# Patient Record
Sex: MALE | Race: WHITE | NOT HISPANIC OR LATINO | Employment: OTHER | URBAN - METROPOLITAN AREA
[De-identification: names, ages, dates, MRNs, and addresses within clinical notes are randomized per-mention and may not be internally consistent; named-entity substitution may affect disease eponyms.]

---

## 2018-07-20 ENCOUNTER — OFFICE VISIT (OUTPATIENT)
Dept: NEUROSURGERY | Facility: CLINIC | Age: 58
End: 2018-07-20

## 2018-07-20 VITALS — WEIGHT: 193 LBS | HEIGHT: 68 IN | TEMPERATURE: 97.3 F | BODY MASS INDEX: 29.25 KG/M2

## 2018-07-20 DIAGNOSIS — M47.816 FACET ARTHRITIS OF LUMBAR REGION: ICD-10-CM

## 2018-07-20 DIAGNOSIS — M51.36 BULGING LUMBAR DISC: Primary | ICD-10-CM

## 2018-07-20 DIAGNOSIS — M51.36 DEGENERATIVE DISC DISEASE, LUMBAR: ICD-10-CM

## 2018-07-20 DIAGNOSIS — Z87.81 HISTORY OF SPINAL FRACTURE: ICD-10-CM

## 2018-07-20 DIAGNOSIS — M54.16 LUMBAR RADICULOPATHY: ICD-10-CM

## 2018-07-20 PROCEDURE — 99203 OFFICE O/P NEW LOW 30 MIN: CPT | Performed by: NEUROLOGICAL SURGERY

## 2018-07-20 RX ORDER — METHADONE HYDROCHLORIDE 10 MG/1
10 TABLET ORAL 4 TIMES DAILY
COMMUNITY
Start: 2018-07-02 | End: 2022-04-08 | Stop reason: SDUPTHER

## 2018-07-20 RX ORDER — DIAZEPAM 10 MG/1
10 TABLET ORAL 4 TIMES DAILY
COMMUNITY
Start: 2018-07-02 | End: 2022-04-08 | Stop reason: SDUPTHER

## 2018-07-20 RX ORDER — TRAZODONE HYDROCHLORIDE 150 MG/1
150 TABLET ORAL 2 TIMES DAILY
COMMUNITY
Start: 2018-01-08 | End: 2022-10-04

## 2018-07-20 RX ORDER — METHYLPREDNISOLONE 4 MG/1
TABLET ORAL
Qty: 21 TABLET | Refills: 0 | Status: SHIPPED | OUTPATIENT
Start: 2018-07-20 | End: 2022-04-08 | Stop reason: SDUPTHER

## 2018-07-20 RX ORDER — DEXLANSOPRAZOLE 60 MG/1
60 CAPSULE, DELAYED RELEASE ORAL DAILY
COMMUNITY
Start: 2018-06-29 | End: 2022-07-27

## 2018-07-20 RX ORDER — GABAPENTIN 600 MG/1
600 TABLET ORAL 4 TIMES DAILY
COMMUNITY
Start: 2018-07-02 | End: 2022-09-02 | Stop reason: SDUPTHER

## 2018-07-20 NOTE — PROGRESS NOTES
Patient: Juan Madden  : 1960    Primary Care Provider: Per Manley MD    Requesting Provider: As above        History    Chief Complaint: Low back and left leg pain.    History of Present Illness: Ms. 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  He indicates that he completely recovered, however, he's been disabled for 38 years.  He's had chronic low back pain.  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  He was getting by until about a month ago when his pain increased.  He has severe back pain that extends into the left thigh to the knee.  It does not go below the knee.  He has no right leg symptoms.  He is worse with any activity.  Nothing really makes them feel better.    Review of Systems   Constitutional: Negative for activity change, appetite change, chills, diaphoresis, fatigue, fever and unexpected weight change.   HENT: Negative for congestion, dental problem, drooling, ear discharge, ear pain, facial swelling, hearing loss, mouth sores, nosebleeds, postnasal drip, rhinorrhea, sinus pressure, sneezing, sore throat, tinnitus, trouble swallowing and voice change.    Eyes: Negative for photophobia, pain, discharge, redness, itching and visual disturbance.   Respiratory: Positive for choking. Negative for apnea, cough, chest tightness, shortness of breath, wheezing and stridor.    Cardiovascular: Negative for chest pain, palpitations and leg swelling.   Gastrointestinal: Negative for abdominal distention, abdominal pain, anal bleeding, blood in stool, constipation, diarrhea, nausea, rectal pain and vomiting.   Endocrine: Negative for cold intolerance, heat intolerance, polydipsia, polyphagia and polyuria.   Genitourinary: Positive for flank pain. Negative for decreased urine volume, difficulty urinating, dysuria, enuresis, frequency, genital sores, hematuria and urgency.   Musculoskeletal: Positive for arthralgias, back pain and myalgias. Negative for  "gait problem, joint swelling, neck pain and neck stiffness.   Skin: Negative for color change, pallor, rash and wound.   Allergic/Immunologic: Negative for environmental allergies, food allergies and immunocompromised state.   Neurological: Positive for weakness and numbness. Negative for dizziness, tremors, seizures, syncope, facial asymmetry, speech difficulty, light-headedness and headaches.   Hematological: Negative for adenopathy. Does not bruise/bleed easily.   Psychiatric/Behavioral: Negative for agitation, behavioral problems, confusion, decreased concentration, dysphoric mood, hallucinations, self-injury, sleep disturbance and suicidal ideas. The patient is not nervous/anxious and is not hyperactive.        The patient's past medical history, past surgical history, family history, and social history have been reviewed at length in the electronic medical record.    Physical Exam:   Temp 97.3 °F (36.3 °C)   Ht 172.7 cm (68\")   Wt 87.5 kg (193 lb)   BMI 29.35 kg/m²   CONSTITUTIONAL: Patient is well-nourished, pleasant and appears stated age.  CV: Heart regular rate and rhythm without murmur, rub, or gallop.  PULMONARY: Lungs are clear to ascultation.  MUSCULOSKELETAL:  Straight leg raising is positive on the left at 45°.  Renny's Sign is positive on the left.  ROM in back is in all directions.  Tenderness in the back to palpation is not observed.  NEUROLOGICAL:  Orientation, memory, attention span, language function, and cognition have been examined and are intact.  Strength is intact in the lower extremities to direct testing.  Muscle tone is normal throughout.  Station and gait are limping.  Sensation is intact to light touch testing throughout.  Deep tendon reflexes are 1+ and symmetrical except the left knee reflex which is difficult to elicit.  Coordination is intact.      Medical Decision Making    Data Review:   MRI of the lumbar spine dated 7/6/18 demonstrates diffuse degenerative disc disease.  " There is mild wedging of L3 that could be degenerative or potentially from a remote trauma.  There is some disc protrusion in the recess and proximal foramen on the left at L4-5 that could compromise the L4 nerve root.    Diagnosis:   Probable left L4 radiculopathy secondary to disc protrusion.    Treatment Options:   I recommended physical therapy and I prescribed a Medrol Dosepak.  He indicates that he cannot afford physical therapy and as such we will provide him with the bulk of home exercises.  He will follow-up in several weeks.  If he continues to struggle then he may require left L4-5 discectomy, but apparently that could be problematic from a financial standpoint as well.       Diagnosis Plan   1. Bulging lumbar disc  MethylPREDNISolone (MEDROL, LESLYE,) 4 MG tablet   2. Lumbar radiculopathy     3. Degenerative disc disease, lumbar     4. Facet arthritis of lumbar region (CMS/HCC)     5. History of spinal fracture         Scribed for Cortez Serra MD by Delores Vigil CMA on 07/20/2018 at 2:47 PM    I, Dr. Serra, personally performed the services described in the documentation, as scribed in my presence, and it is both accurate and complete.

## 2022-04-08 ENCOUNTER — OFFICE VISIT (OUTPATIENT)
Dept: FAMILY MEDICINE CLINIC | Facility: CLINIC | Age: 62
End: 2022-04-08

## 2022-04-08 VITALS — BODY MASS INDEX: 25.92 KG/M2 | WEIGHT: 175 LBS | HEIGHT: 69 IN

## 2022-04-08 DIAGNOSIS — M54.41 CHRONIC BILATERAL LOW BACK PAIN WITH BILATERAL SCIATICA: Primary | ICD-10-CM

## 2022-04-08 DIAGNOSIS — F41.9 ANXIETY: ICD-10-CM

## 2022-04-08 DIAGNOSIS — M54.42 CHRONIC BILATERAL LOW BACK PAIN WITH BILATERAL SCIATICA: Primary | ICD-10-CM

## 2022-04-08 DIAGNOSIS — G89.29 CHRONIC BILATERAL LOW BACK PAIN WITH BILATERAL SCIATICA: Primary | ICD-10-CM

## 2022-04-08 DIAGNOSIS — K21.9 GASTROESOPHAGEAL REFLUX DISEASE WITHOUT ESOPHAGITIS: ICD-10-CM

## 2022-04-08 PROCEDURE — 99442 PR PHYS/QHP TELEPHONE EVALUATION 11-20 MIN: CPT | Performed by: FAMILY MEDICINE

## 2022-04-08 RX ORDER — DEXLANSOPRAZOLE 60 MG/1
1 CAPSULE, DELAYED RELEASE ORAL DAILY
COMMUNITY
Start: 2022-01-10 | End: 2022-04-08

## 2022-04-08 RX ORDER — DIAZEPAM 10 MG/1
10 TABLET ORAL EVERY 6 HOURS
Qty: 120 TABLET | Refills: 0 | Status: SHIPPED | OUTPATIENT
Start: 2022-04-08 | End: 2022-05-05 | Stop reason: SDUPTHER

## 2022-04-08 RX ORDER — DIAZEPAM 10 MG/1
1 TABLET ORAL EVERY 6 HOURS
COMMUNITY
Start: 2022-03-10 | End: 2022-04-08 | Stop reason: SDUPTHER

## 2022-04-08 RX ORDER — METHADONE HYDROCHLORIDE 10 MG/1
10 TABLET ORAL 4 TIMES DAILY
Qty: 120 TABLET | Refills: 0 | Status: SHIPPED | OUTPATIENT
Start: 2022-04-08 | End: 2022-05-05 | Stop reason: SDUPTHER

## 2022-04-08 NOTE — PROGRESS NOTES
"Chief Complaint  Anxiety (medication) and Back Pain (medication)    Subjective          Juan Madden presents to [facility]  Patient comes in today for recheck of his medications.  Patient has done really good he still try to avoid active Covid because he states that he has worried about death and having still some anxiety over this as well.  His visit was done via telephone today.  Because the patient could not connect via Dragonplay.  And he was not offered by doxy front office staff so basically he consented to have it done by telephone      Objective   Vital Signs:   Ht 175.3 cm (69\")   Wt 79.4 kg (175 lb)   BMI 25.84 kg/m²     Body mass index is 25.84 kg/m².    Review of Systems   Constitutional: Negative.    HENT: Negative for congestion, dental problem, ear discharge, ear pain and sore throat.    Respiratory: Negative for apnea, chest tightness and shortness of breath.    Gastrointestinal: Negative for constipation and nausea.   Endocrine: Negative for polyuria.   Genitourinary: Negative for difficulty urinating.   Musculoskeletal: Positive for arthralgias and back pain. Negative for gait problem.   Skin: Negative for rash.   Hematological: Negative for adenopathy.   Psychiatric/Behavioral: The patient is nervous/anxious.        Past History:  Medical History: has a past medical history of Acute frontal sinusitis, Anxiety, At high risk for falls, Chronic low back pain, Chronic pain, Chronic pain syndrome, Degeneration of lumbar intervertebral disc, GERD without esophagitis, H/O anxiety state, Herpes labialis, High risk medication use, History of erectile dysfunction, Insomnia, Low back pain, Lumbago with sciatica, left side, Obstructive sleep apnea syndrome, Protrusion of lumbar intervertebral disc, Radiculopathy, Reactive depression (situational), Tobacco use, and Weight loss.   Surgical History: has a past surgical history that includes No past surgeries.         Current " Outpatient Medications:   •  DEXILANT 60 MG capsule, Take 60 mg by mouth Daily., Disp: , Rfl:   •  diazePAM (VALIUM) 10 MG tablet, Take 1 tablet by mouth Every 6 (Six) Hours., Disp: 120 tablet, Rfl: 0  •  gabapentin (NEURONTIN) 600 MG tablet, Take 600 mg by mouth 4 (Four) Times a Day., Disp: , Rfl:   •  methadone (DOLOPHINE) 10 MG tablet, Take 1 tablet by mouth 4 (Four) Times a Day,, Disp: 120 tablet, Rfl: 0  •  traZODone (DESYREL) 150 MG tablet, Take 150 mg by mouth 2 (Two) Times a Day., Disp: , Rfl:     Allergies: Patient has no known allergies.    Physical Exam  Neurological:      Mental Status: He is alert and oriented to person, place, and time.   Psychiatric:         Mood and Affect: Mood normal.         Behavior: Behavior normal.         Thought Content: Thought content normal.         Judgment: Judgment normal.          Result Review :                   Assessment and Plan    Diagnoses and all orders for this visit:    1. Chronic bilateral low back pain with bilateral sciatica (Primary)  Comments:  Higinio reviewed and medications refilled.  Patient states he is still trying to do some physical activities  Orders:  -     methadone (DOLOPHINE) 10 MG tablet; Take 1 tablet by mouth 4 (Four) Times a Day,  Dispense: 120 tablet; Refill: 0  -     diazePAM (VALIUM) 10 MG tablet; Take 1 tablet by mouth Every 6 (Six) Hours.  Dispense: 120 tablet; Refill: 0    2. Anxiety  Comments:  Stable continue meds    3. Gastroesophageal reflux disease without esophagitis  Comments:  Refill medications and monitor.  Time spent approximately 20 minutes              Follow Up   Return in about 4 weeks (around 5/6/2022).  Patient was given instructions and counseling regarding his condition or for health maintenance advice. Please see specific information pulled into the AVS if appropriate.     Per Manley MD

## 2022-05-05 ENCOUNTER — OFFICE VISIT (OUTPATIENT)
Dept: FAMILY MEDICINE CLINIC | Facility: CLINIC | Age: 62
End: 2022-05-05

## 2022-05-05 VITALS — HEIGHT: 69 IN | WEIGHT: 175 LBS | BODY MASS INDEX: 25.92 KG/M2

## 2022-05-05 DIAGNOSIS — K21.9 GASTROESOPHAGEAL REFLUX DISEASE WITHOUT ESOPHAGITIS: ICD-10-CM

## 2022-05-05 DIAGNOSIS — M54.42 CHRONIC BILATERAL LOW BACK PAIN WITH BILATERAL SCIATICA: ICD-10-CM

## 2022-05-05 DIAGNOSIS — F41.9 ANXIETY: Primary | ICD-10-CM

## 2022-05-05 DIAGNOSIS — M54.41 CHRONIC BILATERAL LOW BACK PAIN WITH BILATERAL SCIATICA: ICD-10-CM

## 2022-05-05 DIAGNOSIS — G89.29 CHRONIC BILATERAL LOW BACK PAIN WITH BILATERAL SCIATICA: ICD-10-CM

## 2022-05-05 PROCEDURE — 99214 OFFICE O/P EST MOD 30 MIN: CPT | Performed by: FAMILY MEDICINE

## 2022-05-05 RX ORDER — METHADONE HYDROCHLORIDE 10 MG/1
10 TABLET ORAL 4 TIMES DAILY
Qty: 120 TABLET | Refills: 0 | Status: SHIPPED | OUTPATIENT
Start: 2022-05-05 | End: 2022-06-06 | Stop reason: SDUPTHER

## 2022-05-05 RX ORDER — DIAZEPAM 10 MG/1
10 TABLET ORAL EVERY 6 HOURS
Qty: 120 TABLET | Refills: 0 | Status: SHIPPED | OUTPATIENT
Start: 2022-05-05 | End: 2022-06-06 | Stop reason: SDUPTHER

## 2022-05-05 NOTE — PROGRESS NOTES
"Chief Complaint  Anxiety and Back Pain    Subjective          Juan Madden presents to [facility]  History of Present Illness    Objective   Vital Signs:   Ht 175.3 cm (69\")   Wt 79.4 kg (175 lb)   BMI 25.84 kg/m²     Body mass index is 25.84 kg/m².    Review of Systems   Constitutional: Negative.    HENT: Negative for congestion, dental problem, ear discharge, ear pain and sore throat.    Respiratory: Negative for apnea, chest tightness and shortness of breath.    Gastrointestinal: Negative for constipation and nausea.   Endocrine: Negative for polyuria.   Genitourinary: Negative for difficulty urinating.   Musculoskeletal: Positive for back pain. Negative for gait problem.   Skin: Negative for rash.   Hematological: Negative for adenopathy.   Psychiatric/Behavioral: Positive for depressed mood. The patient is nervous/anxious.        Past History:  Medical History: has a past medical history of Acute frontal sinusitis, Anxiety, At high risk for falls, Chronic low back pain, Chronic pain, Chronic pain syndrome, Degeneration of lumbar intervertebral disc, GERD without esophagitis, H/O anxiety state, Herpes labialis, High risk medication use, History of erectile dysfunction, Insomnia, Low back pain, Lumbago with sciatica, left side, Obstructive sleep apnea syndrome, Protrusion of lumbar intervertebral disc, Radiculopathy, Reactive depression (situational), Tobacco use, and Weight loss.   Surgical History: has a past surgical history that includes No past surgeries.         Current Outpatient Medications:   •  diazePAM (VALIUM) 10 MG tablet, Take 1 tablet by mouth Every 6 (Six) Hours., Disp: 120 tablet, Rfl: 0  •  methadone (DOLOPHINE) 10 MG tablet, Take 1 tablet by mouth 4 (Four) Times a Day,, Disp: 120 tablet, Rfl: 0  •  DEXILANT 60 MG capsule, Take 60 mg by mouth Daily., Disp: , Rfl:   •  gabapentin (NEURONTIN) 600 MG tablet, Take 600 mg by mouth 4 (Four) Times a Day., Disp: , Rfl: "   •  traZODone (DESYREL) 150 MG tablet, Take 150 mg by mouth 2 (Two) Times a Day., Disp: , Rfl:     Allergies: Patient has no known allergies.    Physical Exam  Constitutional:       Appearance: Normal appearance.   Neurological:      General: No focal deficit present.      Mental Status: He is alert and oriented to person, place, and time.   Psychiatric:         Mood and Affect: Mood normal.         Behavior: Behavior normal.         Thought Content: Thought content normal.          Result Review :                   Assessment and Plan    Diagnoses and all orders for this visit:    1. Anxiety (Primary)  Comments:  Higinio reviewed meds refilled    2. Chronic bilateral low back pain with bilateral sciatica  Comments:  Higinio reviewed and medications refilled.  Patient states he is still trying to do some physical activities  Orders:  -     methadone (DOLOPHINE) 10 MG tablet; Take 1 tablet by mouth 4 (Four) Times a Day,  Dispense: 120 tablet; Refill: 0  -     diazePAM (VALIUM) 10 MG tablet; Take 1 tablet by mouth Every 6 (Six) Hours.  Dispense: 120 tablet; Refill: 0    3. Gastroesophageal reflux disease without esophagitis  Comments:  He states that he has ran of his medicines but got some more and of no prescription and states he is doing fine              Follow Up   Return in about 4 weeks (around 6/2/2022).  Patient was given instructions and counseling regarding his condition or for health maintenance advice. Please see specific information pulled into the AVS if appropriate.     Per Manley MD

## 2022-05-18 NOTE — PROGRESS NOTES
"Chief Complaint  Anxiety and Back Pain    Subjective          Juan Madden presents to [facility]  Patient states he needs to get his medications refilled he has been doing pretty good he has not not been exposed to COVID recently he has been still staying in and does not want to come the office for this.  Patient gives consent to have his visit done through the telephone.      Objective   Vital Signs:   Ht 175.3 cm (69\")   Wt 79.4 kg (175 lb)   BMI 25.84 kg/m²     Body mass index is 25.84 kg/m².    Review of Systems    Past History:  Medical History: has a past medical history of Acute frontal sinusitis, Anxiety, At high risk for falls, Chronic low back pain, Chronic pain, Chronic pain syndrome, Degeneration of lumbar intervertebral disc, GERD without esophagitis, H/O anxiety state, Herpes labialis, High risk medication use, History of erectile dysfunction, Insomnia, Low back pain, Lumbago with sciatica, left side, Obstructive sleep apnea syndrome, Protrusion of lumbar intervertebral disc, Radiculopathy, Reactive depression (situational), Tobacco use, and Weight loss.   Surgical History: has a past surgical history that includes No past surgeries.         Current Outpatient Medications:   •  diazePAM (VALIUM) 10 MG tablet, Take 1 tablet by mouth Every 6 (Six) Hours., Disp: 120 tablet, Rfl: 0  •  methadone (DOLOPHINE) 10 MG tablet, Take 1 tablet by mouth 4 (Four) Times a Day,, Disp: 120 tablet, Rfl: 0  •  DEXILANT 60 MG capsule, Take 60 mg by mouth Daily., Disp: , Rfl:   •  gabapentin (NEURONTIN) 600 MG tablet, Take 600 mg by mouth 4 (Four) Times a Day., Disp: , Rfl:   •  traZODone (DESYREL) 150 MG tablet, Take 150 mg by mouth 2 (Two) Times a Day., Disp: , Rfl:     Allergies: Patient has no known allergies.    Physical Exam     Result Review :                   Assessment and Plan    Diagnoses and all orders for this visit:    1. Anxiety (Primary)  Comments:  Higinio reviewed meds " refilled    2. Chronic bilateral low back pain with bilateral sciatica  Comments:  Higinio reviewed and medications refilled.  Patient states he is still trying to do some physical activities  Orders:  -     methadone (DOLOPHINE) 10 MG tablet; Take 1 tablet by mouth 4 (Four) Times a Day,  Dispense: 120 tablet; Refill: 0  -     diazePAM (VALIUM) 10 MG tablet; Take 1 tablet by mouth Every 6 (Six) Hours.  Dispense: 120 tablet; Refill: 0    3. Gastroesophageal reflux disease without esophagitis  Comments:  He states that he has ran of his medicines but got some more and of no prescription and states he is doing fine              Follow Up   Return in about 4 weeks (around 6/2/2022).  Patient was given instructions and counseling regarding his condition or for health maintenance advice. Please see specific information pulled into the AVS if appropriate.     Per Manley MD

## 2022-06-06 ENCOUNTER — OFFICE VISIT (OUTPATIENT)
Dept: FAMILY MEDICINE CLINIC | Facility: CLINIC | Age: 62
End: 2022-06-06

## 2022-06-06 DIAGNOSIS — G89.29 CHRONIC BILATERAL LOW BACK PAIN WITH BILATERAL SCIATICA: ICD-10-CM

## 2022-06-06 DIAGNOSIS — M54.41 CHRONIC BILATERAL LOW BACK PAIN WITH BILATERAL SCIATICA: ICD-10-CM

## 2022-06-06 DIAGNOSIS — G25.81 RESTLESS LEG SYNDROME: ICD-10-CM

## 2022-06-06 DIAGNOSIS — K21.9 GASTROESOPHAGEAL REFLUX DISEASE WITHOUT ESOPHAGITIS: Primary | ICD-10-CM

## 2022-06-06 DIAGNOSIS — M54.42 CHRONIC BILATERAL LOW BACK PAIN WITH BILATERAL SCIATICA: ICD-10-CM

## 2022-06-06 PROCEDURE — 99212 OFFICE O/P EST SF 10 MIN: CPT | Performed by: FAMILY MEDICINE

## 2022-06-06 RX ORDER — METHADONE HYDROCHLORIDE 10 MG/1
10 TABLET ORAL 4 TIMES DAILY
Qty: 120 TABLET | Refills: 0 | Status: SHIPPED | OUTPATIENT
Start: 2022-06-06 | End: 2022-07-06 | Stop reason: SDUPTHER

## 2022-06-06 RX ORDER — DIAZEPAM 10 MG/1
10 TABLET ORAL EVERY 6 HOURS
Qty: 120 TABLET | Refills: 0 | Status: SHIPPED | OUTPATIENT
Start: 2022-06-06 | End: 2022-07-06 | Stop reason: SDUPTHER

## 2022-06-06 NOTE — PROGRESS NOTES
Chief Complaint  Back Pain    Subjective          Juan Madden presents to [facility]  Back Pain  Associated symptoms include numbness.       Objective   Vital Signs:   There were no vitals taken for this visit.    There is no height or weight on file to calculate BMI.    Review of Systems   Musculoskeletal: Positive for back pain.   Neurological: Positive for numbness.       Past History:  Medical History: has a past medical history of Acute frontal sinusitis, Anxiety, At high risk for falls, Chronic low back pain, Chronic pain, Chronic pain syndrome, Degeneration of lumbar intervertebral disc, GERD without esophagitis, H/O anxiety state, Herpes labialis, High risk medication use, History of erectile dysfunction, Insomnia, Low back pain, Lumbago with sciatica, left side, Obstructive sleep apnea syndrome, Protrusion of lumbar intervertebral disc, Radiculopathy, Reactive depression (situational), Tobacco use, and Weight loss.   Surgical History: has a past surgical history that includes No past surgeries.         Current Outpatient Medications:   •  diazePAM (VALIUM) 10 MG tablet, Take 1 tablet by mouth Every 6 (Six) Hours., Disp: 120 tablet, Rfl: 0  •  methadone (DOLOPHINE) 10 MG tablet, Take 1 tablet by mouth 4 (Four) Times a Day,, Disp: 120 tablet, Rfl: 0  •  DEXILANT 60 MG capsule, Take 60 mg by mouth Daily., Disp: , Rfl:   •  gabapentin (NEURONTIN) 600 MG tablet, Take 600 mg by mouth 4 (Four) Times a Day., Disp: , Rfl:   •  traZODone (DESYREL) 150 MG tablet, Take 150 mg by mouth 2 (Two) Times a Day., Disp: , Rfl:     Allergies: Patient has no known allergies.    Physical Exam  Neurological:      Mental Status: He is alert and oriented to person, place, and time.   Psychiatric:         Mood and Affect: Mood normal.         Behavior: Behavior normal.         Thought Content: Thought content normal.          Result Review :                   Assessment and Plan    Diagnoses and all  orders for this visit:    1. Gastroesophageal reflux disease without esophagitis (Primary)  Comments:  continue meds    2. Chronic bilateral low back pain with bilateral sciatica  Comments:  Higinio reviewed and medications refilled.  Patient states he is still trying to do some physical activities  Orders:  -     diazePAM (VALIUM) 10 MG tablet; Take 1 tablet by mouth Every 6 (Six) Hours.  Dispense: 120 tablet; Refill: 0  -     methadone (DOLOPHINE) 10 MG tablet; Take 1 tablet by mouth 4 (Four) Times a Day,  Dispense: 120 tablet; Refill: 0    3. Restless leg syndrome  Comments:  monitoring      I spent 15 minutes caring for Juan on this date of service. This time includes time spent by me in the following activities:reviewing tests, obtaining and/or reviewing a separately obtained history and ordering medications, tests, or procedures        Follow Up   No follow-ups on file.  Patient was given instructions and counseling regarding his condition or for health maintenance advice. Please see specific information pulled into the AVS if appropriate.     Per Manley MD

## 2022-06-09 RX ORDER — SILDENAFIL 100 MG/1
TABLET, FILM COATED ORAL
Qty: 30 TABLET | Refills: 0 | Status: SHIPPED | OUTPATIENT
Start: 2022-06-09 | End: 2022-07-07

## 2022-07-06 ENCOUNTER — OFFICE VISIT (OUTPATIENT)
Dept: FAMILY MEDICINE CLINIC | Facility: CLINIC | Age: 62
End: 2022-07-06

## 2022-07-06 VITALS — HEIGHT: 69 IN | BODY MASS INDEX: 25.33 KG/M2 | WEIGHT: 171 LBS

## 2022-07-06 DIAGNOSIS — M54.41 CHRONIC BILATERAL LOW BACK PAIN WITH BILATERAL SCIATICA: ICD-10-CM

## 2022-07-06 DIAGNOSIS — M54.42 CHRONIC BILATERAL LOW BACK PAIN WITH BILATERAL SCIATICA: ICD-10-CM

## 2022-07-06 DIAGNOSIS — G25.81 RESTLESS LEG SYNDROME: Primary | ICD-10-CM

## 2022-07-06 DIAGNOSIS — G89.29 CHRONIC BILATERAL LOW BACK PAIN WITH BILATERAL SCIATICA: ICD-10-CM

## 2022-07-06 DIAGNOSIS — K21.9 GASTROESOPHAGEAL REFLUX DISEASE WITHOUT ESOPHAGITIS: ICD-10-CM

## 2022-07-06 PROCEDURE — 99212 OFFICE O/P EST SF 10 MIN: CPT | Performed by: FAMILY MEDICINE

## 2022-07-06 RX ORDER — DIAZEPAM 10 MG/1
10 TABLET ORAL EVERY 6 HOURS
Qty: 120 TABLET | Refills: 0 | Status: SHIPPED | OUTPATIENT
Start: 2022-07-06 | End: 2022-08-02 | Stop reason: SDUPTHER

## 2022-07-06 RX ORDER — METHADONE HYDROCHLORIDE 10 MG/1
10 TABLET ORAL 4 TIMES DAILY
Qty: 120 TABLET | Refills: 0 | Status: SHIPPED | OUTPATIENT
Start: 2022-07-06 | End: 2022-08-02 | Stop reason: SDUPTHER

## 2022-07-06 NOTE — PROGRESS NOTES
"Chief Complaint  Back Pain (Patient needs a refill on his pain med.), Anxiety (Patient needs a refill on his med.), and Erectile Dysfunction (Pt needs a refill on his med.)    Subjective          Juan Madden presents to [facility]  Comes in today needing his medications refilled he denies any other problems or resection of a short of breath or different problems or difficulties at this time.  She does consent to have his visit done through the telephone today      Objective   Vital Signs:   Ht 175.3 cm (69\")   Wt 77.6 kg (171 lb) Comment: Pt reported vitals.  BMI 25.25 kg/m²     Body mass index is 25.25 kg/m².    Review of Systems   Constitutional: Negative.    HENT: Negative for congestion, dental problem, ear discharge, ear pain and sore throat.    Respiratory: Negative for apnea, chest tightness and shortness of breath.    Gastrointestinal: Negative for constipation and nausea.   Endocrine: Negative for polyuria.   Genitourinary: Negative for difficulty urinating.   Musculoskeletal: Positive for arthralgias and back pain. Negative for gait problem.   Skin: Negative for rash.   Hematological: Negative for adenopathy.   Psychiatric/Behavioral: The patient is nervous/anxious.        Past History:  Medical History: has a past medical history of Acute frontal sinusitis, Anxiety, At high risk for falls, Chronic low back pain, Chronic pain, Chronic pain syndrome, Degeneration of lumbar intervertebral disc, GERD without esophagitis, H/O anxiety state, Herpes labialis, High risk medication use, History of erectile dysfunction, Insomnia, Low back pain, Lumbago with sciatica, left side, Obstructive sleep apnea syndrome, Protrusion of lumbar intervertebral disc, Radiculopathy, Reactive depression (situational), Tobacco use, and Weight loss.   Surgical History: has a past surgical history that includes No past surgeries.         Current Outpatient Medications:   •  DEXILANT 60 MG capsule, " Take 60 mg by mouth Daily., Disp: , Rfl:   •  gabapentin (NEURONTIN) 600 MG tablet, Take 600 mg by mouth 4 (Four) Times a Day., Disp: , Rfl:   •  sildenafil (VIAGRA) 100 MG tablet, TAKE ONE TABLET BY MOUTH  AS NEEDED APPROXIMATELY  ONE HOUR BEFORE SEXUAL ACTIVITY, Disp: 30 tablet, Rfl: 0  •  traZODone (DESYREL) 150 MG tablet, Take 150 mg by mouth 2 (Two) Times a Day., Disp: , Rfl:   •  diazePAM (VALIUM) 10 MG tablet, Take 1 tablet by mouth Every 6 (Six) Hours., Disp: 120 tablet, Rfl: 0  •  methadone (DOLOPHINE) 10 MG tablet, Take 1 tablet by mouth 4 (Four) Times a Day,, Disp: 120 tablet, Rfl: 0    Allergies: Patient has no known allergies.    Physical Exam  Neurological:      General: No focal deficit present.      Mental Status: He is alert and oriented to person, place, and time.   Psychiatric:         Mood and Affect: Mood normal.          Result Review :                   Assessment and Plan    Diagnoses and all orders for this visit:    1. Restless leg syndrome (Primary)  Comments:  Continue meds and monitor    2. Chronic bilateral low back pain with bilateral sciatica  Comments:  Higinio reviewed and medications refilled.  Patient states he is still trying to do some physical activities  Orders:  -     methadone (DOLOPHINE) 10 MG tablet; Take 1 tablet by mouth 4 (Four) Times a Day,  Dispense: 120 tablet; Refill: 0  -     diazePAM (VALIUM) 10 MG tablet; Take 1 tablet by mouth Every 6 (Six) Hours.  Dispense: 120 tablet; Refill: 0    3. Gastroesophageal reflux disease without esophagitis  Comments:  stable continue meds      I spent 13 minutes caring for Juan on this date of service. This time includes time spent by me in the following activities:preparing for the visit, obtaining and/or reviewing a separately obtained history and ordering medications, tests, or procedures        Follow Up   Return in about 4 weeks (around 8/3/2022).  Patient was given instructions and counseling regarding his condition or for  health maintenance advice. Please see specific information pulled into the AVS if appropriate.     Per Manley MD

## 2022-07-07 RX ORDER — SILDENAFIL 100 MG/1
TABLET, FILM COATED ORAL
Qty: 30 TABLET | Refills: 0 | Status: SHIPPED | OUTPATIENT
Start: 2022-07-07 | End: 2022-08-02 | Stop reason: SDUPTHER

## 2022-07-07 NOTE — TELEPHONE ENCOUNTER
ANISH PATIENT     Patient called and he forgot to ask for refill on his Sildenafil 100mg tablet when he had appt yesterday.

## 2022-07-27 RX ORDER — DEXLANSOPRAZOLE 60 MG/1
CAPSULE, DELAYED RELEASE ORAL
Qty: 90 CAPSULE | Refills: 0 | Status: SHIPPED | OUTPATIENT
Start: 2022-07-27 | End: 2022-08-02 | Stop reason: SDUPTHER

## 2022-08-02 ENCOUNTER — OFFICE VISIT (OUTPATIENT)
Dept: FAMILY MEDICINE CLINIC | Facility: CLINIC | Age: 62
End: 2022-08-02

## 2022-08-02 DIAGNOSIS — G25.81 RESTLESS LEG SYNDROME: Primary | ICD-10-CM

## 2022-08-02 DIAGNOSIS — G89.29 CHRONIC BILATERAL LOW BACK PAIN WITH BILATERAL SCIATICA: ICD-10-CM

## 2022-08-02 DIAGNOSIS — M54.41 CHRONIC BILATERAL LOW BACK PAIN WITH BILATERAL SCIATICA: ICD-10-CM

## 2022-08-02 DIAGNOSIS — M54.42 CHRONIC BILATERAL LOW BACK PAIN WITH BILATERAL SCIATICA: ICD-10-CM

## 2022-08-02 DIAGNOSIS — K21.9 GASTROESOPHAGEAL REFLUX DISEASE WITHOUT ESOPHAGITIS: ICD-10-CM

## 2022-08-02 PROCEDURE — 99442 PR PHYS/QHP TELEPHONE EVALUATION 11-20 MIN: CPT | Performed by: FAMILY MEDICINE

## 2022-08-02 RX ORDER — SILDENAFIL 100 MG/1
TABLET, FILM COATED ORAL
Qty: 30 TABLET | Refills: 5 | Status: SHIPPED | OUTPATIENT
Start: 2022-08-02 | End: 2023-03-02 | Stop reason: SDUPTHER

## 2022-08-02 RX ORDER — DEXLANSOPRAZOLE 60 MG/1
1 CAPSULE, DELAYED RELEASE ORAL DAILY
Qty: 90 CAPSULE | Refills: 1 | Status: SHIPPED | OUTPATIENT
Start: 2022-08-02 | End: 2023-03-02

## 2022-08-02 RX ORDER — DIAZEPAM 10 MG/1
10 TABLET ORAL EVERY 6 HOURS
Qty: 120 TABLET | Refills: 0 | Status: SHIPPED | OUTPATIENT
Start: 2022-08-02 | End: 2022-09-02 | Stop reason: SDUPTHER

## 2022-08-02 RX ORDER — METHADONE HYDROCHLORIDE 10 MG/1
10 TABLET ORAL 4 TIMES DAILY
Qty: 120 TABLET | Refills: 0 | Status: SHIPPED | OUTPATIENT
Start: 2022-08-02 | End: 2022-09-02 | Stop reason: SDUPTHER

## 2022-08-02 NOTE — PROGRESS NOTES
Chief Complaint  Anxiety and Back Pain    Subjective          Juan Madden presents to [facility]  Comes in today says he needs his meds refilled he says otherwise has been doing pretty good he says that he has been trying avoid the COVID and denies any other difficulties.  Patient does give consent to have his visit done through the telephone today      Objective   Vital Signs:   There were no vitals taken for this visit.    There is no height or weight on file to calculate BMI.    Review of Systems   Constitutional: Negative.    HENT: Negative for congestion, dental problem, ear discharge, ear pain and sore throat.    Respiratory: Negative for apnea, chest tightness and shortness of breath.    Gastrointestinal: Negative for constipation and nausea.   Endocrine: Negative for polyuria.   Genitourinary: Negative for difficulty urinating.   Musculoskeletal: Positive for arthralgias and back pain. Negative for gait problem.   Skin: Negative for rash.   Hematological: Negative for adenopathy.       Past History:  Medical History: has a past medical history of Acute frontal sinusitis, Anxiety, At high risk for falls, Chronic low back pain, Chronic pain, Chronic pain syndrome, Degeneration of lumbar intervertebral disc, GERD without esophagitis, H/O anxiety state, Herpes labialis, High risk medication use, History of erectile dysfunction, Insomnia, Low back pain, Lumbago with sciatica, left side, Obstructive sleep apnea syndrome, Protrusion of lumbar intervertebral disc, Radiculopathy, Reactive depression (situational), Tobacco use, and Weight loss.   Surgical History: has a past surgical history that includes No past surgeries.         Current Outpatient Medications:   •  dexlansoprazole (Dexilant) 60 MG capsule, Take 1 capsule by mouth Daily., Disp: 90 capsule, Rfl: 1  •  diazePAM (VALIUM) 10 MG tablet, Take 1 tablet by mouth Every 6 (Six) Hours., Disp: 120 tablet, Rfl: 0  •  methadone  (DOLOPHINE) 10 MG tablet, Take 1 tablet by mouth 4 (Four) Times a Day,, Disp: 120 tablet, Rfl: 0  •  sildenafil (VIAGRA) 100 MG tablet, Half to 1 tablet 30 minutes before sexual activity, Disp: 30 tablet, Rfl: 5  •  gabapentin (NEURONTIN) 600 MG tablet, Take 600 mg by mouth 4 (Four) Times a Day., Disp: , Rfl:   •  traZODone (DESYREL) 150 MG tablet, Take 150 mg by mouth 2 (Two) Times a Day., Disp: , Rfl:     Allergies: Patient has no known allergies.    Physical Exam  Constitutional:       Appearance: Normal appearance.   Neurological:      General: No focal deficit present.      Mental Status: He is alert. He is disoriented.   Psychiatric:         Mood and Affect: Mood normal.          Result Review :                   Assessment and Plan    Diagnoses and all orders for this visit:    1. Restless leg syndrome (Primary)  Comments:  Stable continue medications    2. Gastroesophageal reflux disease without esophagitis  Comments:  Refill Dexilant  Orders:  -     dexlansoprazole (Dexilant) 60 MG capsule; Take 1 capsule by mouth Daily.  Dispense: 90 capsule; Refill: 1    3. Chronic bilateral low back pain with bilateral sciatica  Comments:  Higinio reviewed and medications refilled.  Patient states he is still trying to do some physical activities  Orders:  -     diazePAM (VALIUM) 10 MG tablet; Take 1 tablet by mouth Every 6 (Six) Hours.  Dispense: 120 tablet; Refill: 0  -     methadone (DOLOPHINE) 10 MG tablet; Take 1 tablet by mouth 4 (Four) Times a Day,  Dispense: 120 tablet; Refill: 0    Other orders  -     sildenafil (VIAGRA) 100 MG tablet; Half to 1 tablet 30 minutes before sexual activity  Dispense: 30 tablet; Refill: 5      I spent 15 minutes caring for Juan on this date of service. This time includes time spent by me in the following activities:preparing for the visit, obtaining and/or reviewing a separately obtained history, performing a medically appropriate examination and/or evaluation  and ordering  medications, tests, or procedures        Follow Up   Return in about 4 weeks (around 8/30/2022).  Patient was given instructions and counseling regarding his condition or for health maintenance advice. Please see specific information pulled into the AVS if appropriate.     Per Manley MD

## 2022-09-02 ENCOUNTER — TELEMEDICINE (OUTPATIENT)
Dept: FAMILY MEDICINE CLINIC | Facility: CLINIC | Age: 62
End: 2022-09-02

## 2022-09-02 VITALS — HEIGHT: 69 IN | BODY MASS INDEX: 26.66 KG/M2 | WEIGHT: 180 LBS

## 2022-09-02 DIAGNOSIS — M54.42 CHRONIC BILATERAL LOW BACK PAIN WITH BILATERAL SCIATICA: ICD-10-CM

## 2022-09-02 DIAGNOSIS — K21.9 GASTROESOPHAGEAL REFLUX DISEASE WITHOUT ESOPHAGITIS: ICD-10-CM

## 2022-09-02 DIAGNOSIS — M54.41 CHRONIC BILATERAL LOW BACK PAIN WITH BILATERAL SCIATICA: ICD-10-CM

## 2022-09-02 DIAGNOSIS — G89.29 CHRONIC BILATERAL LOW BACK PAIN WITH BILATERAL SCIATICA: ICD-10-CM

## 2022-09-02 DIAGNOSIS — G25.81 RESTLESS LEG SYNDROME: Primary | ICD-10-CM

## 2022-09-02 PROCEDURE — 99213 OFFICE O/P EST LOW 20 MIN: CPT | Performed by: FAMILY MEDICINE

## 2022-09-02 RX ORDER — DIAZEPAM 10 MG/1
10 TABLET ORAL EVERY 6 HOURS
Qty: 120 TABLET | Refills: 0 | Status: SHIPPED | OUTPATIENT
Start: 2022-09-02 | End: 2022-10-04 | Stop reason: SDUPTHER

## 2022-09-02 RX ORDER — METHADONE HYDROCHLORIDE 10 MG/1
10 TABLET ORAL 4 TIMES DAILY
Qty: 120 TABLET | Refills: 0 | Status: SHIPPED | OUTPATIENT
Start: 2022-09-02 | End: 2022-10-04 | Stop reason: SDUPTHER

## 2022-09-02 RX ORDER — GABAPENTIN 600 MG/1
600 TABLET ORAL 3 TIMES DAILY
Qty: 90 TABLET | Refills: 5 | Status: SHIPPED | OUTPATIENT
Start: 2022-09-02 | End: 2023-03-02 | Stop reason: SDUPTHER

## 2022-09-02 NOTE — PROGRESS NOTES
"Chief Complaint  Restless Legs Syndrome (Med refill) and Back Pain (Med refill )    Subjective          Juan Madden presents to [facility]  Patient comes in today to get his medications refilled says that he is restless legs been acting up and he wants to get a refill on his Neurontin he says everything else has been doing pretty good he is avoided getting COVID presently.  Patient consents to having visit done through [email or web address] patient today is actually at home and I am at the clinic    Back Pain        Objective   Vital Signs:   Ht 175.3 cm (69\")   Wt 81.6 kg (180 lb)   BMI 26.58 kg/m²     Body mass index is 26.58 kg/m².    Review of Systems   Constitutional: Negative.    HENT: Negative for congestion, dental problem, ear discharge, ear pain and sore throat.    Respiratory: Negative for apnea, chest tightness and shortness of breath.    Gastrointestinal: Negative for constipation and nausea.   Endocrine: Negative for polyuria.   Genitourinary: Negative for difficulty urinating.   Musculoskeletal: Positive for back pain. Negative for arthralgias and gait problem.   Skin: Negative for rash.   Hematological: Negative for adenopathy.       Past History:  Medical History: has a past medical history of Acute frontal sinusitis, Anxiety, At high risk for falls, Chronic low back pain, Chronic pain, Chronic pain syndrome, Degeneration of lumbar intervertebral disc, GERD without esophagitis, H/O anxiety state, Herpes labialis, High risk medication use, History of erectile dysfunction, Insomnia, Low back pain, Lumbago with sciatica, left side, Obstructive sleep apnea syndrome, Protrusion of lumbar intervertebral disc, Radiculopathy, Reactive depression (situational), Tobacco use, and Weight loss.   Surgical History: has a past surgical history that includes No past surgeries.         Current Outpatient Medications:   •  dexlansoprazole (Dexilant) 60 MG capsule, Take 1 capsule by mouth Daily., " Disp: 90 capsule, Rfl: 1  •  diazePAM (VALIUM) 10 MG tablet, Take 1 tablet by mouth Every 6 (Six) Hours., Disp: 120 tablet, Rfl: 0  •  gabapentin (NEURONTIN) 600 MG tablet, Take 1 tablet by mouth 3 (Three) Times a Day., Disp: 90 tablet, Rfl: 5  •  methadone (DOLOPHINE) 10 MG tablet, Take 1 tablet by mouth 4 (Four) Times a Day,, Disp: 120 tablet, Rfl: 0  •  sildenafil (VIAGRA) 100 MG tablet, Half to 1 tablet 30 minutes before sexual activity, Disp: 30 tablet, Rfl: 5  •  traZODone (DESYREL) 150 MG tablet, Take 150 mg by mouth 2 (Two) Times a Day., Disp: , Rfl:     Allergies: Patient has no known allergies.    Physical Exam  Vitals reviewed.   Constitutional:       Appearance: Normal appearance.   HENT:      Head: Normocephalic.      Nose: Nose normal.   Neurological:      General: No focal deficit present.      Mental Status: He is alert and oriented to person, place, and time.   Psychiatric:         Mood and Affect: Mood normal.         Behavior: Behavior normal.          Result Review :                   Assessment and Plan    Diagnoses and all orders for this visit:    1. Restless leg syndrome (Primary)  Comments:  As per refill Neurontin  Orders:  -     gabapentin (NEURONTIN) 600 MG tablet; Take 1 tablet by mouth 3 (Three) Times a Day.  Dispense: 90 tablet; Refill: 5    2. Chronic bilateral low back pain with bilateral sciatica  Comments:  Higinio reviewed and medications refilled.  Patient states he is still trying to do some physical activities  Orders:  -     methadone (DOLOPHINE) 10 MG tablet; Take 1 tablet by mouth 4 (Four) Times a Day,  Dispense: 120 tablet; Refill: 0  -     diazePAM (VALIUM) 10 MG tablet; Take 1 tablet by mouth Every 6 (Six) Hours.  Dispense: 120 tablet; Refill: 0    3. Gastroesophageal reflux disease without esophagitis  Comments:  Patient is Dexilant has been refilled couple days ago he can pick it up when he wants              Follow Up   No follow-ups on file.  Patient was given  instructions and counseling regarding his condition or for health maintenance advice. Please see specific information pulled into the AVS if appropriate.     Per Manley MD

## 2022-10-04 ENCOUNTER — TELEMEDICINE (OUTPATIENT)
Dept: FAMILY MEDICINE CLINIC | Facility: CLINIC | Age: 62
End: 2022-10-04

## 2022-10-04 DIAGNOSIS — M54.41 CHRONIC BILATERAL LOW BACK PAIN WITH BILATERAL SCIATICA: Primary | ICD-10-CM

## 2022-10-04 DIAGNOSIS — Z12.11 COLON CANCER SCREENING: ICD-10-CM

## 2022-10-04 DIAGNOSIS — G89.29 CHRONIC BILATERAL LOW BACK PAIN WITH BILATERAL SCIATICA: Primary | ICD-10-CM

## 2022-10-04 DIAGNOSIS — M54.42 CHRONIC BILATERAL LOW BACK PAIN WITH BILATERAL SCIATICA: Primary | ICD-10-CM

## 2022-10-04 DIAGNOSIS — G25.81 RESTLESS LEG SYNDROME: ICD-10-CM

## 2022-10-04 DIAGNOSIS — R42 DIZZINESS: ICD-10-CM

## 2022-10-04 PROCEDURE — 99213 OFFICE O/P EST LOW 20 MIN: CPT | Performed by: FAMILY MEDICINE

## 2022-10-04 RX ORDER — DIAZEPAM 10 MG/1
10 TABLET ORAL EVERY 6 HOURS
Qty: 120 TABLET | Refills: 0 | Status: SHIPPED | OUTPATIENT
Start: 2022-10-04 | End: 2022-11-03 | Stop reason: SDUPTHER

## 2022-10-04 RX ORDER — METHADONE HYDROCHLORIDE 10 MG/1
10 TABLET ORAL 4 TIMES DAILY
Qty: 120 TABLET | Refills: 0 | Status: SHIPPED | OUTPATIENT
Start: 2022-10-04 | End: 2022-11-03 | Stop reason: SDUPTHER

## 2022-10-04 NOTE — PROGRESS NOTES
Chief Complaint  Anxiety and Back Pain    Subjective          Juan Madden presents to [facility]  History of Present Illness  Patient comes in today saying he needs his medications refilled he has had some dizziness off and on and is not sure exactly what caused it he says he just feels like his balance is off and for few days he said he had a little bit of a cold but he says he not sure that had anything to do with it.  He also states he took a fit test and it was reported back from his insurance company is positive and he would like to get a screening colonoscopy set up as well.  Patient consents to have visit done through [email or web address] today patient is at home and I am in the clinic      Objective   Vital Signs:   There were no vitals taken for this visit.    There is no height or weight on file to calculate BMI.    Review of Systems   Constitutional: Negative.    HENT: Negative for congestion, dental problem, ear discharge, ear pain and sore throat.    Respiratory: Negative for apnea, chest tightness and shortness of breath.    Gastrointestinal: Negative for constipation and nausea.   Endocrine: Negative for polyuria.   Genitourinary: Negative for difficulty urinating.   Musculoskeletal: Positive for arthralgias and back pain. Negative for gait problem.   Skin: Negative for rash.   Hematological: Negative for adenopathy.   Psychiatric/Behavioral: The patient is nervous/anxious.        Past History:  Medical History: has a past medical history of Acute frontal sinusitis, Anxiety, At high risk for falls, Chronic low back pain, Chronic pain, Chronic pain syndrome, Degeneration of lumbar intervertebral disc, GERD without esophagitis, H/O anxiety state, Herpes labialis, High risk medication use, History of erectile dysfunction, Insomnia, Low back pain, Lumbago with sciatica, left side, Obstructive sleep apnea syndrome, Protrusion of lumbar intervertebral disc, Radiculopathy, Reactive  depression (situational), Tobacco use, and Weight loss.   Surgical History: has a past surgical history that includes No past surgeries.         Current Outpatient Medications:   •  diazePAM (VALIUM) 10 MG tablet, Take 1 tablet by mouth Every 6 (Six) Hours., Disp: 120 tablet, Rfl: 0  •  methadone (DOLOPHINE) 10 MG tablet, Take 1 tablet by mouth 4 (Four) Times a Day,, Disp: 120 tablet, Rfl: 0  •  dexlansoprazole (Dexilant) 60 MG capsule, Take 1 capsule by mouth Daily., Disp: 90 capsule, Rfl: 1  •  gabapentin (NEURONTIN) 600 MG tablet, Take 1 tablet by mouth 3 (Three) Times a Day., Disp: 90 tablet, Rfl: 5  •  sildenafil (VIAGRA) 100 MG tablet, Half to 1 tablet 30 minutes before sexual activity, Disp: 30 tablet, Rfl: 5    Allergies: Patient has no known allergies.    Physical Exam  Constitutional:       Appearance: Normal appearance. He is normal weight.   HENT:      Head: Normocephalic.      Right Ear: External ear normal.      Left Ear: External ear normal.      Nose: Nose normal.   Eyes:      Pupils: Pupils are equal, round, and reactive to light.   Pulmonary:      Effort: Pulmonary effort is normal.   Neurological:      General: No focal deficit present.      Mental Status: He is alert and oriented to person, place, and time.   Psychiatric:         Mood and Affect: Mood normal.          Result Review :                   Assessment and Plan    Diagnoses and all orders for this visit:    1. Chronic bilateral low back pain with bilateral sciatica (Primary)  Comments:  Higinio reviewed and medications refilled.  Patient states he is still trying to do some physical activities  Orders:  -     methadone (DOLOPHINE) 10 MG tablet; Take 1 tablet by mouth 4 (Four) Times a Day,  Dispense: 120 tablet; Refill: 0  -     diazePAM (VALIUM) 10 MG tablet; Take 1 tablet by mouth Every 6 (Six) Hours.  Dispense: 120 tablet; Refill: 0    2. Dizziness  Comments:  possibly related to medication and reduce doses and if not better  return    3. Colon cancer screening  Comments:  states had positive fit test and refer for colonoscopy  Orders:  -     Ambulatory Referral to Vascular Surgery    4. Restless leg syndrome  Comments:  stable and monitor              Follow Up   Return in about 4 weeks (around 11/1/2022).  Patient was given instructions and counseling regarding his condition or for health maintenance advice. Please see specific information pulled into the AVS if appropriate.     Per Manley MD

## 2022-10-07 DIAGNOSIS — Z12.11 COLON CANCER SCREENING: Primary | ICD-10-CM

## 2022-11-03 ENCOUNTER — TELEMEDICINE (OUTPATIENT)
Dept: FAMILY MEDICINE CLINIC | Facility: CLINIC | Age: 62
End: 2022-11-03

## 2022-11-03 VITALS — HEIGHT: 69 IN | WEIGHT: 180 LBS | BODY MASS INDEX: 26.66 KG/M2

## 2022-11-03 DIAGNOSIS — M54.41 CHRONIC BILATERAL LOW BACK PAIN WITH BILATERAL SCIATICA: ICD-10-CM

## 2022-11-03 DIAGNOSIS — G89.29 CHRONIC BILATERAL LOW BACK PAIN WITH BILATERAL SCIATICA: ICD-10-CM

## 2022-11-03 DIAGNOSIS — K21.9 GASTROESOPHAGEAL REFLUX DISEASE WITHOUT ESOPHAGITIS: Primary | ICD-10-CM

## 2022-11-03 DIAGNOSIS — M54.42 CHRONIC BILATERAL LOW BACK PAIN WITH BILATERAL SCIATICA: ICD-10-CM

## 2022-11-03 PROCEDURE — 99213 OFFICE O/P EST LOW 20 MIN: CPT | Performed by: FAMILY MEDICINE

## 2022-11-03 RX ORDER — DIAZEPAM 10 MG/1
10 TABLET ORAL EVERY 6 HOURS
Qty: 120 TABLET | Refills: 0 | Status: SHIPPED | OUTPATIENT
Start: 2022-11-03 | End: 2022-12-05 | Stop reason: SDUPTHER

## 2022-11-03 RX ORDER — METHADONE HYDROCHLORIDE 10 MG/1
10 TABLET ORAL 4 TIMES DAILY
Qty: 120 TABLET | Refills: 0 | Status: SHIPPED | OUTPATIENT
Start: 2022-11-03 | End: 2022-12-05 | Stop reason: SDUPTHER

## 2022-11-03 NOTE — PROGRESS NOTES
"Chief Complaint  Restless Legs Syndrome (Medication refill)    Subjective          Juan Madden presents to [facility]  History of Present Illness  Patient comes in today needs his medications refilled says he been doing pretty good denies any difficulties he is set up to get his colonoscopy consultation in November 22 he states otherwise he is doing pretty good.  Patient is sent to have his visit done through [employer] today he is at home today and I am in the clinic      Objective   Vital Signs:   Ht 175.3 cm (69\")   Wt 81.6 kg (180 lb)   BMI 26.58 kg/m²     Body mass index is 26.58 kg/m².    Review of Systems   Constitutional: Negative.    HENT: Negative for congestion, dental problem, ear discharge, ear pain and sore throat.    Respiratory: Negative for apnea, chest tightness and shortness of breath.    Gastrointestinal: Negative for constipation and nausea.   Endocrine: Negative for polyuria.   Genitourinary: Negative for difficulty urinating.   Musculoskeletal: Positive for arthralgias and back pain. Negative for gait problem.   Skin: Negative for rash.   Hematological: Negative for adenopathy.       Past History:  Medical History: has a past medical history of Acute frontal sinusitis, Anxiety, At high risk for falls, Chronic low back pain, Chronic pain, Chronic pain syndrome, Degeneration of lumbar intervertebral disc, GERD without esophagitis, H/O anxiety state, Herpes labialis, High risk medication use, History of erectile dysfunction, Insomnia, Low back pain, Lumbago with sciatica, left side, Obstructive sleep apnea syndrome, Protrusion of lumbar intervertebral disc, Radiculopathy, Reactive depression (situational), Tobacco use, and Weight loss.   Surgical History: has a past surgical history that includes No past surgeries.         Current Outpatient Medications:   •  dexlansoprazole (Dexilant) 60 MG capsule, Take 1 capsule by mouth Daily., Disp: 90 capsule, Rfl: 1  •  " diazePAM (VALIUM) 10 MG tablet, Take 1 tablet by mouth Every 6 (Six) Hours., Disp: 120 tablet, Rfl: 0  •  gabapentin (NEURONTIN) 600 MG tablet, Take 1 tablet by mouth 3 (Three) Times a Day., Disp: 90 tablet, Rfl: 5  •  methadone (DOLOPHINE) 10 MG tablet, Take 1 tablet by mouth 4 (Four) Times a Day,, Disp: 120 tablet, Rfl: 0  •  sildenafil (VIAGRA) 100 MG tablet, Half to 1 tablet 30 minutes before sexual activity, Disp: 30 tablet, Rfl: 5    Allergies: Patient has no known allergies.    Physical Exam  Constitutional:       Appearance: Normal appearance.   HENT:      Head: Normocephalic.      Right Ear: External ear normal.      Left Ear: External ear normal.      Nose: Nose normal.   Eyes:      Pupils: Pupils are equal, round, and reactive to light.   Neurological:      Mental Status: He is alert and oriented to person, place, and time. Mental status is at baseline.   Psychiatric:         Mood and Affect: Mood normal.          Result Review :                   Assessment and Plan    Diagnoses and all orders for this visit:    1. Gastroesophageal reflux disease without esophagitis (Primary)  Comments:  Continue medications plan for an endoscopy evaluation    2. Chronic bilateral low back pain with bilateral sciatica  Comments:  Higinio reviewed and medications refilled.  Patient states he is still trying to do some physical activities              Follow Up   No follow-ups on file.  Patient was given instructions and counseling regarding his condition or for health maintenance advice. Please see specific information pulled into the AVS if appropriate.     Per Manley MD

## 2022-12-05 ENCOUNTER — TELEMEDICINE (OUTPATIENT)
Dept: FAMILY MEDICINE CLINIC | Facility: CLINIC | Age: 62
End: 2022-12-05

## 2022-12-05 DIAGNOSIS — G89.29 CHRONIC BILATERAL LOW BACK PAIN WITH BILATERAL SCIATICA: ICD-10-CM

## 2022-12-05 DIAGNOSIS — M54.42 CHRONIC BILATERAL LOW BACK PAIN WITH BILATERAL SCIATICA: ICD-10-CM

## 2022-12-05 DIAGNOSIS — M54.41 CHRONIC BILATERAL LOW BACK PAIN WITH BILATERAL SCIATICA: ICD-10-CM

## 2022-12-05 DIAGNOSIS — K21.9 GASTROESOPHAGEAL REFLUX DISEASE WITHOUT ESOPHAGITIS: Primary | ICD-10-CM

## 2022-12-05 PROCEDURE — 99213 OFFICE O/P EST LOW 20 MIN: CPT | Performed by: FAMILY MEDICINE

## 2022-12-05 RX ORDER — DIAZEPAM 10 MG/1
10 TABLET ORAL EVERY 6 HOURS
Qty: 120 TABLET | Refills: 0 | Status: SHIPPED | OUTPATIENT
Start: 2022-12-05 | End: 2023-01-05 | Stop reason: SDUPTHER

## 2022-12-05 RX ORDER — ACYCLOVIR 400 MG/1
400 TABLET ORAL 2 TIMES DAILY
Qty: 30 TABLET | Refills: 5 | Status: SHIPPED | OUTPATIENT
Start: 2022-12-05

## 2022-12-05 RX ORDER — METHADONE HYDROCHLORIDE 10 MG/1
10 TABLET ORAL 4 TIMES DAILY
Qty: 120 TABLET | Refills: 0 | Status: SHIPPED | OUTPATIENT
Start: 2022-12-05 | End: 2023-01-05 | Stop reason: SDUPTHER

## 2022-12-05 NOTE — PROGRESS NOTES
Chief Complaint  Back Pain    Subjective          Juan Madden presents to [facility]  History of Present Illness  Patient comes in today needs medications refilled says he is doing relatively well here recently he says he has not had a flareup of fever blisters and would like some ice like a very cold and is well patient does consent to have his visit done through [employer] today he is at home and I am in the clinic      Objective   Vital Signs:   There were no vitals taken for this visit.    There is no height or weight on file to calculate BMI.    Review of Systems   Constitutional: Negative.    HENT: Negative for congestion, dental problem, ear discharge, ear pain and sore throat.    Respiratory: Negative for apnea, chest tightness and shortness of breath.    Gastrointestinal: Negative for constipation and nausea.   Endocrine: Negative for polyuria.   Genitourinary: Negative for difficulty urinating.   Musculoskeletal: Positive for arthralgias and back pain. Negative for gait problem.   Skin: Negative for rash.   Hematological: Negative for adenopathy.       Past History:  Medical History: has a past medical history of Acute frontal sinusitis, Anxiety, At high risk for falls, Chronic low back pain, Chronic pain, Chronic pain syndrome, Degeneration of lumbar intervertebral disc, GERD without esophagitis, H/O anxiety state, Herpes labialis, High risk medication use, History of erectile dysfunction, Insomnia, Low back pain, Lumbago with sciatica, left side, Obstructive sleep apnea syndrome, Protrusion of lumbar intervertebral disc, Radiculopathy, Reactive depression (situational), Tobacco use, and Weight loss.   Surgical History: has a past surgical history that includes No past surgeries.         Current Outpatient Medications:   •  dexlansoprazole (Dexilant) 60 MG capsule, Take 1 capsule by mouth Daily., Disp: 90 capsule, Rfl: 1  •  diazePAM (VALIUM) 10 MG tablet, Take 1 tablet by  mouth Every 6 (Six) Hours., Disp: 120 tablet, Rfl: 0  •  gabapentin (NEURONTIN) 600 MG tablet, Take 1 tablet by mouth 3 (Three) Times a Day., Disp: 90 tablet, Rfl: 5  •  methadone (DOLOPHINE) 10 MG tablet, Take 1 tablet by mouth 4 (Four) Times a Day,, Disp: 120 tablet, Rfl: 0  •  sildenafil (VIAGRA) 100 MG tablet, Half to 1 tablet 30 minutes before sexual activity, Disp: 30 tablet, Rfl: 5  •  acyclovir (Zovirax) 400 MG tablet, Take 1 tablet by mouth 2 (Two) Times a Day. Take no more than 5 doses a day., Disp: 30 tablet, Rfl: 5    Allergies: Patient has no known allergies.    Physical Exam  Constitutional:       Appearance: Normal appearance.   HENT:      Right Ear: External ear normal.      Left Ear: External ear normal.      Nose: Nose normal.   Pulmonary:      Effort: Pulmonary effort is normal.   Neurological:      Mental Status: He is alert and oriented to person, place, and time.   Psychiatric:         Mood and Affect: Mood normal.          Result Review :                   Assessment and Plan    Diagnoses and all orders for this visit:    1. Gastroesophageal reflux disease without esophagitis (Primary)  Comments:  refilled meds    2. Chronic bilateral low back pain with bilateral sciatica  Comments:  Higinio reviewed and medications refilled.  Patient states he is still trying to do some physical activities  Orders:  -     methadone (DOLOPHINE) 10 MG tablet; Take 1 tablet by mouth 4 (Four) Times a Day,  Dispense: 120 tablet; Refill: 0  -     diazePAM (VALIUM) 10 MG tablet; Take 1 tablet by mouth Every 6 (Six) Hours.  Dispense: 120 tablet; Refill: 0    Other orders  -     acyclovir (Zovirax) 400 MG tablet; Take 1 tablet by mouth 2 (Two) Times a Day. Take no more than 5 doses a day.  Dispense: 30 tablet; Refill: 5              Follow Up   No follow-ups on file.  Patient was given instructions and counseling regarding his condition or for health maintenance advice. Please see specific information pulled into the  AVS if appropriate.     Per Manley MD

## 2023-01-05 ENCOUNTER — TELEMEDICINE (OUTPATIENT)
Dept: FAMILY MEDICINE CLINIC | Facility: CLINIC | Age: 63
End: 2023-01-05
Payer: MEDICARE

## 2023-01-05 DIAGNOSIS — M54.41 CHRONIC BILATERAL LOW BACK PAIN WITH BILATERAL SCIATICA: ICD-10-CM

## 2023-01-05 DIAGNOSIS — K21.9 GASTROESOPHAGEAL REFLUX DISEASE WITHOUT ESOPHAGITIS: ICD-10-CM

## 2023-01-05 DIAGNOSIS — M54.42 CHRONIC BILATERAL LOW BACK PAIN WITH BILATERAL SCIATICA: ICD-10-CM

## 2023-01-05 DIAGNOSIS — G89.29 CHRONIC BILATERAL LOW BACK PAIN WITH BILATERAL SCIATICA: ICD-10-CM

## 2023-01-05 PROCEDURE — 99213 OFFICE O/P EST LOW 20 MIN: CPT | Performed by: FAMILY MEDICINE

## 2023-01-05 RX ORDER — DIAZEPAM 10 MG/1
10 TABLET ORAL EVERY 6 HOURS
Qty: 120 TABLET | Refills: 0 | Status: SHIPPED | OUTPATIENT
Start: 2023-01-05 | End: 2023-02-03 | Stop reason: SDUPTHER

## 2023-01-05 RX ORDER — METHADONE HYDROCHLORIDE 10 MG/1
10 TABLET ORAL 4 TIMES DAILY
Qty: 120 TABLET | Refills: 0 | Status: SHIPPED | OUTPATIENT
Start: 2023-01-05 | End: 2023-02-03 | Stop reason: SDUPTHER

## 2023-01-05 RX ORDER — ESOMEPRAZOLE MAGNESIUM 40 MG/1
40 CAPSULE, DELAYED RELEASE ORAL
Qty: 90 CAPSULE | Refills: 1 | Status: SHIPPED | OUTPATIENT
Start: 2023-01-05

## 2023-01-05 NOTE — PROGRESS NOTES
Chief Complaint  Restless Legs Syndrome and Back Pain    Subjective          Juan Madden presents to [facility]  History of Present Illness  Patient comes in today needs his medications refilled says he is doing relatively well he says he has insurance does not go to pay for his Dexilant anymore he needs to be switched off of it.  Patient does consent to have his visit done through [email or web address] he is in the home and I am in the clinic      Objective   Vital Signs:   There were no vitals taken for this visit.    There is no height or weight on file to calculate BMI.    Review of Systems   Constitutional: Negative.    HENT: Negative for congestion, dental problem, ear discharge, ear pain and sore throat.    Respiratory: Negative for apnea, chest tightness and shortness of breath.    Gastrointestinal: Negative for constipation and nausea.   Endocrine: Negative for polyuria.   Genitourinary: Negative for difficulty urinating.   Musculoskeletal: Positive for arthralgias and back pain. Negative for gait problem.   Skin: Negative for rash.   Hematological: Negative for adenopathy.   Psychiatric/Behavioral: The patient is nervous/anxious.        Past History:  Medical History: has a past medical history of Acute frontal sinusitis, Anxiety, At high risk for falls, Chronic low back pain, Chronic pain, Chronic pain syndrome, Degeneration of lumbar intervertebral disc, GERD without esophagitis, H/O anxiety state, Herpes labialis, High risk medication use, History of erectile dysfunction, Insomnia, Low back pain, Lumbago with sciatica, left side, Obstructive sleep apnea syndrome, Protrusion of lumbar intervertebral disc, Radiculopathy, Reactive depression (situational), Tobacco use, and Weight loss.   Surgical History: has a past surgical history that includes No past surgeries.         Current Outpatient Medications:   •  acyclovir (Zovirax) 400 MG tablet, Take 1 tablet by mouth 2 (Two) Times a Day.  Take no more than 5 doses a day., Disp: 30 tablet, Rfl: 5  •  dexlansoprazole (Dexilant) 60 MG capsule, Take 1 capsule by mouth Daily., Disp: 90 capsule, Rfl: 1  •  diazePAM (VALIUM) 10 MG tablet, Take 1 tablet by mouth Every 6 (Six) Hours., Disp: 120 tablet, Rfl: 0  •  gabapentin (NEURONTIN) 600 MG tablet, Take 1 tablet by mouth 3 (Three) Times a Day., Disp: 90 tablet, Rfl: 5  •  methadone (DOLOPHINE) 10 MG tablet, Take 1 tablet by mouth 4 (Four) Times a Day,, Disp: 120 tablet, Rfl: 0  •  sildenafil (VIAGRA) 100 MG tablet, Half to 1 tablet 30 minutes before sexual activity, Disp: 30 tablet, Rfl: 5  •  esomeprazole (nexIUM) 40 MG capsule, Take 1 capsule by mouth Every Morning Before Breakfast., Disp: 90 capsule, Rfl: 1    Allergies: Patient has no known allergies.    Physical Exam  Constitutional:       Appearance: Normal appearance.   HENT:      Right Ear: External ear normal.      Left Ear: External ear normal.      Nose: Nose normal.   Neurological:      Mental Status: He is alert and oriented to person, place, and time.   Psychiatric:         Mood and Affect: Mood normal.          Result Review :                   Assessment and Plan    Diagnoses and all orders for this visit:    1. Chronic bilateral low back pain with bilateral sciatica  Comments:  Higinio reviewed and medications refilled.  Patient states he is still trying to do some physical activities  Orders:  -     diazePAM (VALIUM) 10 MG tablet; Take 1 tablet by mouth Every 6 (Six) Hours.  Dispense: 120 tablet; Refill: 0  -     methadone (DOLOPHINE) 10 MG tablet; Take 1 tablet by mouth 4 (Four) Times a Day,  Dispense: 120 tablet; Refill: 0    2. Gastroesophageal reflux disease without esophagitis  Comments:  Will try nexium and montior  Orders:  -     esomeprazole (nexIUM) 40 MG capsule; Take 1 capsule by mouth Every Morning Before Breakfast.  Dispense: 90 capsule; Refill: 1              Follow Up   No follow-ups on file.  Patient was given instructions  and counseling regarding his condition or for health maintenance advice. Please see specific information pulled into the AVS if appropriate.     Per Manley MD

## 2023-02-03 ENCOUNTER — TELEMEDICINE (OUTPATIENT)
Dept: FAMILY MEDICINE CLINIC | Facility: CLINIC | Age: 63
End: 2023-02-03
Payer: MEDICARE

## 2023-02-03 DIAGNOSIS — G89.29 CHRONIC BILATERAL LOW BACK PAIN WITH BILATERAL SCIATICA: ICD-10-CM

## 2023-02-03 DIAGNOSIS — M79.605 PAIN OF LEFT LOWER EXTREMITY: Primary | ICD-10-CM

## 2023-02-03 DIAGNOSIS — M54.41 CHRONIC BILATERAL LOW BACK PAIN WITH BILATERAL SCIATICA: ICD-10-CM

## 2023-02-03 DIAGNOSIS — M54.42 CHRONIC BILATERAL LOW BACK PAIN WITH BILATERAL SCIATICA: ICD-10-CM

## 2023-02-03 DIAGNOSIS — F41.9 ANXIETY: ICD-10-CM

## 2023-02-03 PROCEDURE — 99213 OFFICE O/P EST LOW 20 MIN: CPT | Performed by: FAMILY MEDICINE

## 2023-02-03 RX ORDER — METHADONE HYDROCHLORIDE 10 MG/1
10 TABLET ORAL 4 TIMES DAILY
Qty: 120 TABLET | Refills: 0 | Status: SHIPPED | OUTPATIENT
Start: 2023-02-03 | End: 2023-03-02 | Stop reason: SDUPTHER

## 2023-02-03 RX ORDER — DIAZEPAM 10 MG/1
10 TABLET ORAL EVERY 6 HOURS
Qty: 120 TABLET | Refills: 0 | Status: SHIPPED | OUTPATIENT
Start: 2023-02-03 | End: 2023-03-02 | Stop reason: SDUPTHER

## 2023-02-03 RX ORDER — METHYLPREDNISOLONE 4 MG/1
TABLET ORAL
Qty: 19 TABLET | Refills: 0 | Status: SHIPPED | OUTPATIENT
Start: 2023-02-03 | End: 2023-02-13

## 2023-02-03 NOTE — PROGRESS NOTES
Chief Complaint  Back Pain    Subjective          LONNIE RANGEL presents to [facility]  History of Present Illness  Patient comes in today saying that he needs to get his medications refilled he is doing relatively well he has had some more pain noted on his left leg and his sciatica is been acting up a little bit he has responded well to steroids in the past.  He does consent to have his visit done through [email or web address] he is at home and I am in the clinic      Objective   Vital Signs:   There were no vitals taken for this visit.    There is no height or weight on file to calculate BMI.    Review of Systems   Constitutional: Negative.    HENT: Negative for congestion, dental problem, ear discharge, ear pain and sore throat.    Respiratory: Negative for apnea, chest tightness and shortness of breath.    Gastrointestinal: Negative for constipation and nausea.   Endocrine: Negative for polyuria.   Genitourinary: Negative for difficulty urinating.   Musculoskeletal: Positive for arthralgias and back pain. Negative for gait problem.   Skin: Negative for rash.   Hematological: Negative for adenopathy.   Psychiatric/Behavioral: Positive for depressed mood. The patient is nervous/anxious.        Past History:  Medical History: has a past medical history of Acute frontal sinusitis, Anxiety, At high risk for falls, Chronic low back pain, Chronic pain, Chronic pain syndrome, Degeneration of lumbar intervertebral disc, GERD without esophagitis, H/O anxiety state, Herpes labialis, High risk medication use, History of erectile dysfunction, Insomnia, Low back pain, Lumbago with sciatica, left side, Obstructive sleep apnea syndrome, Protrusion of lumbar intervertebral disc, Radiculopathy, Reactive depression (situational), Tobacco use, and Weight loss.   Surgical History: has a past surgical history that includes No past surgeries.         Current Outpatient Medications:   •  acyclovir (Zovirax) 400 MG  tablet, Take 1 tablet by mouth 2 (Two) Times a Day. Take no more than 5 doses a day., Disp: 30 tablet, Rfl: 5  •  dexlansoprazole (Dexilant) 60 MG capsule, Take 1 capsule by mouth Daily., Disp: 90 capsule, Rfl: 1  •  diazePAM (VALIUM) 10 MG tablet, Take 1 tablet by mouth Every 6 (Six) Hours., Disp: 120 tablet, Rfl: 0  •  esomeprazole (nexIUM) 40 MG capsule, Take 1 capsule by mouth Every Morning Before Breakfast., Disp: 90 capsule, Rfl: 1  •  gabapentin (NEURONTIN) 600 MG tablet, Take 1 tablet by mouth 3 (Three) Times a Day., Disp: 90 tablet, Rfl: 5  •  methadone (DOLOPHINE) 10 MG tablet, Take 1 tablet by mouth 4 (Four) Times a Day,, Disp: 120 tablet, Rfl: 0  •  sildenafil (VIAGRA) 100 MG tablet, Half to 1 tablet 30 minutes before sexual activity, Disp: 30 tablet, Rfl: 5  •  methylPREDNISolone (MEDROL) 4 MG tablet, Take 3 tablets by mouth Daily for 3 days, THEN 2 tablets Daily for 3 days, THEN 1 tablet Daily for 4 days., Disp: 19 tablet, Rfl: 0    Allergies: Patient has no known allergies.    Physical Exam  Vitals reviewed.   Constitutional:       Appearance: Normal appearance.   HENT:      Head: Normocephalic.      Right Ear: External ear normal.      Left Ear: External ear normal.      Nose: Nose normal.   Neurological:      General: No focal deficit present.      Mental Status: He is alert. Mental status is at baseline.   Psychiatric:         Mood and Affect: Mood normal.         Behavior: Behavior normal.          Result Review :                   Assessment and Plan    Diagnoses and all orders for this visit:    1. Pain of left lower extremity (Primary)  Comments:  steriods and meds and exercises    2. Chronic bilateral low back pain with bilateral sciatica  Comments:  Higinio reviewed and medications refilled.  Patient states he is still trying to do some physical activities  Orders:  -     diazePAM (VALIUM) 10 MG tablet; Take 1 tablet by mouth Every 6 (Six) Hours.  Dispense: 120 tablet; Refill: 0  -      methadone (DOLOPHINE) 10 MG tablet; Take 1 tablet by mouth 4 (Four) Times a Day,  Dispense: 120 tablet; Refill: 0    3. Anxiety  Comments:  stable    Other orders  -     methylPREDNISolone (MEDROL) 4 MG tablet; Take 3 tablets by mouth Daily for 3 days, THEN 2 tablets Daily for 3 days, THEN 1 tablet Daily for 4 days.  Dispense: 19 tablet; Refill: 0              Follow Up   No follow-ups on file.  Patient was given instructions and counseling regarding his condition or for health maintenance advice. Please see specific information pulled into the AVS if appropriate.     Per Manley MD

## 2023-03-02 ENCOUNTER — TELEMEDICINE (OUTPATIENT)
Dept: FAMILY MEDICINE CLINIC | Facility: CLINIC | Age: 63
End: 2023-03-02
Payer: MEDICARE

## 2023-03-02 VITALS — BODY MASS INDEX: 26.66 KG/M2 | HEIGHT: 69 IN | WEIGHT: 180 LBS

## 2023-03-02 DIAGNOSIS — G89.29 CHRONIC BILATERAL LOW BACK PAIN WITH BILATERAL SCIATICA: ICD-10-CM

## 2023-03-02 DIAGNOSIS — M54.42 CHRONIC BILATERAL LOW BACK PAIN WITH BILATERAL SCIATICA: ICD-10-CM

## 2023-03-02 DIAGNOSIS — M54.41 CHRONIC BILATERAL LOW BACK PAIN WITH BILATERAL SCIATICA: ICD-10-CM

## 2023-03-02 DIAGNOSIS — G25.81 RESTLESS LEG SYNDROME: ICD-10-CM

## 2023-03-02 PROCEDURE — 99214 OFFICE O/P EST MOD 30 MIN: CPT | Performed by: FAMILY MEDICINE

## 2023-03-02 RX ORDER — GABAPENTIN 600 MG/1
600 TABLET ORAL 3 TIMES DAILY
Qty: 90 TABLET | Refills: 5 | Status: SHIPPED | OUTPATIENT
Start: 2023-03-02

## 2023-03-02 RX ORDER — SILDENAFIL 100 MG/1
TABLET, FILM COATED ORAL
Qty: 30 TABLET | Refills: 5 | Status: SHIPPED | OUTPATIENT
Start: 2023-03-02

## 2023-03-02 RX ORDER — DIAZEPAM 10 MG/1
10 TABLET ORAL EVERY 6 HOURS
Qty: 120 TABLET | Refills: 0 | Status: SHIPPED | OUTPATIENT
Start: 2023-03-02 | End: 2023-04-04 | Stop reason: SDUPTHER

## 2023-03-02 RX ORDER — METHADONE HYDROCHLORIDE 10 MG/1
10 TABLET ORAL 4 TIMES DAILY
Qty: 120 TABLET | Refills: 0 | Status: SHIPPED | OUTPATIENT
Start: 2023-03-02 | End: 2023-04-04 | Stop reason: SDUPTHER

## 2023-03-02 NOTE — PROGRESS NOTES
"Chief Complaint  Restless Legs Syndrome and Back Pain    Subjective          LONNIE RANGEL presents to [facility]  History of Present Illness  She states doing relatively well still having some problems out of his left leg he says he is trying to do some exercises things to try to help he says his medications do make a difference but states it has been a chronic issue.  Patient does consent to have his visit through [email or web address] today he is at home and I am in the clinic      Objective   Vital Signs:   Ht 175.3 cm (69.02\")   Wt 81.6 kg (180 lb)   BMI 26.57 kg/m²     Body mass index is 26.57 kg/m².    Review of Systems   Constitutional: Negative.    HENT: Negative for congestion, dental problem, ear discharge, ear pain and sore throat.    Respiratory: Negative for apnea, chest tightness and shortness of breath.    Gastrointestinal: Negative for constipation and nausea.   Endocrine: Negative for polyuria.   Genitourinary: Negative for difficulty urinating.   Musculoskeletal: Positive for arthralgias and back pain. Negative for gait problem.   Skin: Negative for rash.   Hematological: Negative for adenopathy.       Past History:  Medical History: has a past medical history of Acute frontal sinusitis, Anxiety, At high risk for falls, Chronic low back pain, Chronic pain, Chronic pain syndrome, Degeneration of lumbar intervertebral disc, GERD without esophagitis, H/O anxiety state, Herpes labialis, High risk medication use, History of erectile dysfunction, Insomnia, Low back pain, Lumbago with sciatica, left side, Obstructive sleep apnea syndrome, Protrusion of lumbar intervertebral disc, Radiculopathy, Reactive depression (situational), Tobacco use, and Weight loss.   Surgical History: has a past surgical history that includes No past surgeries.         Current Outpatient Medications:   •  diazePAM (VALIUM) 10 MG tablet, Take 1 tablet by mouth Every 6 (Six) Hours., Disp: 120 tablet, Rfl: " 0  •  gabapentin (NEURONTIN) 600 MG tablet, Take 1 tablet by mouth 3 (Three) Times a Day., Disp: 90 tablet, Rfl: 5  •  methadone (DOLOPHINE) 10 MG tablet, Take 1 tablet by mouth 4 (Four) Times a Day,, Disp: 120 tablet, Rfl: 0  •  sildenafil (VIAGRA) 100 MG tablet, Half to 1 tablet 30 minutes before sexual activity, Disp: 30 tablet, Rfl: 5  •  acyclovir (Zovirax) 400 MG tablet, Take 1 tablet by mouth 2 (Two) Times a Day. Take no more than 5 doses a day., Disp: 30 tablet, Rfl: 5  •  esomeprazole (nexIUM) 40 MG capsule, Take 1 capsule by mouth Every Morning Before Breakfast., Disp: 90 capsule, Rfl: 1    Allergies: Patient has no known allergies.    Physical Exam  Constitutional:       Appearance: Normal appearance. He is normal weight.   HENT:      Head: Normocephalic and atraumatic.      Right Ear: External ear normal.      Left Ear: External ear normal.      Nose: Nose normal.   Neurological:      Mental Status: He is alert. Mental status is at baseline.   Psychiatric:         Mood and Affect: Mood normal.         Behavior: Behavior normal.          Result Review :                   Assessment and Plan    Diagnoses and all orders for this visit:    1. Chronic bilateral low back pain with bilateral sciatica  Comments:  Higinio reviewed and medications and deiscussed exercises  Orders:  -     diazePAM (VALIUM) 10 MG tablet; Take 1 tablet by mouth Every 6 (Six) Hours.  Dispense: 120 tablet; Refill: 0  -     methadone (DOLOPHINE) 10 MG tablet; Take 1 tablet by mouth 4 (Four) Times a Day,  Dispense: 120 tablet; Refill: 0    2. Restless leg syndrome  Comments:  As per refill Neurontin  Orders:  -     gabapentin (NEURONTIN) 600 MG tablet; Take 1 tablet by mouth 3 (Three) Times a Day.  Dispense: 90 tablet; Refill: 5    Other orders  -     sildenafil (VIAGRA) 100 MG tablet; Half to 1 tablet 30 minutes before sexual activity  Dispense: 30 tablet; Refill: 5              Follow Up   Return in about 4 weeks (around  3/30/2023).  Patient was given instructions and counseling regarding his condition or for health maintenance advice. Please see specific information pulled into the AVS if appropriate.     Per Manley MD

## 2023-04-04 ENCOUNTER — TELEMEDICINE (OUTPATIENT)
Dept: FAMILY MEDICINE CLINIC | Facility: CLINIC | Age: 63
End: 2023-04-04
Payer: MEDICARE

## 2023-04-04 VITALS — BODY MASS INDEX: 26.66 KG/M2 | HEIGHT: 69 IN | WEIGHT: 180 LBS

## 2023-04-04 DIAGNOSIS — M54.41 CHRONIC BILATERAL LOW BACK PAIN WITH BILATERAL SCIATICA: Primary | ICD-10-CM

## 2023-04-04 DIAGNOSIS — M54.42 CHRONIC BILATERAL LOW BACK PAIN WITH BILATERAL SCIATICA: Primary | ICD-10-CM

## 2023-04-04 DIAGNOSIS — M19.09 PRIMARY OSTEOARTHRITIS OF OTHER SITE: ICD-10-CM

## 2023-04-04 DIAGNOSIS — G89.29 CHRONIC BILATERAL LOW BACK PAIN WITH BILATERAL SCIATICA: Primary | ICD-10-CM

## 2023-04-04 RX ORDER — METHADONE HYDROCHLORIDE 10 MG/1
10 TABLET ORAL 4 TIMES DAILY
Qty: 120 TABLET | Refills: 0 | Status: SHIPPED | OUTPATIENT
Start: 2023-04-04

## 2023-04-04 RX ORDER — DIAZEPAM 10 MG/1
10 TABLET ORAL EVERY 6 HOURS
Qty: 120 TABLET | Refills: 0 | Status: SHIPPED | OUTPATIENT
Start: 2023-04-04

## 2023-04-04 RX ORDER — CELECOXIB 200 MG/1
200 CAPSULE ORAL DAILY
Qty: 30 CAPSULE | Refills: 0 | Status: SHIPPED | OUTPATIENT
Start: 2023-04-04

## 2023-04-04 NOTE — PROGRESS NOTES
"Chief Complaint  Anxiety and Back Pain    Subjective          LONNIE RANGEL presents to [facility]  History of Present Illness  He comes in saying it he is needing his medications refilled he also states that the base of his right and left thumbs are both killing him his left slightly worse than his right he like to get some medications from it because it is Tylenol is not helping him he does not know what he is done he thinks he may just be some arthritic flareup and that has been somewhat bothering for about a week.  Patient does consent to have his visit done through [email or web address] today he is at home and I am in the clinic  Anxiety  Patient reports no nausea or shortness of breath.       Back Pain        Objective   Vital Signs:   Ht 175.3 cm (69.02\")   Wt 81.6 kg (180 lb)   BMI 26.57 kg/m²     Body mass index is 26.57 kg/m².    Review of Systems   Constitutional: Negative.    HENT: Negative for congestion, dental problem, ear discharge, ear pain and sore throat.    Respiratory: Negative for apnea, chest tightness and shortness of breath.    Gastrointestinal: Negative for constipation and nausea.   Endocrine: Negative for polyuria.   Genitourinary: Negative for difficulty urinating.   Musculoskeletal: Positive for arthralgias and back pain. Negative for gait problem.   Skin: Negative for rash.   Hematological: Negative for adenopathy.       Past History:  Medical History: has a past medical history of Acute frontal sinusitis, Anxiety, At high risk for falls, Chronic low back pain, Chronic pain, Chronic pain syndrome, Degeneration of lumbar intervertebral disc, GERD without esophagitis, H/O anxiety state, Herpes labialis, High risk medication use, History of erectile dysfunction, Insomnia, Low back pain, Lumbago with sciatica, left side, Obstructive sleep apnea syndrome, Protrusion of lumbar intervertebral disc, Radiculopathy, Reactive depression (situational), Tobacco use, and " Weight loss.   Surgical History: has a past surgical history that includes No past surgeries.         Current Outpatient Medications:   •  acyclovir (Zovirax) 400 MG tablet, Take 1 tablet by mouth 2 (Two) Times a Day. Take no more than 5 doses a day., Disp: 30 tablet, Rfl: 5  •  diazePAM (VALIUM) 10 MG tablet, Take 1 tablet by mouth Every 6 (Six) Hours., Disp: 120 tablet, Rfl: 0  •  esomeprazole (nexIUM) 40 MG capsule, Take 1 capsule by mouth Every Morning Before Breakfast., Disp: 90 capsule, Rfl: 1  •  gabapentin (NEURONTIN) 600 MG tablet, Take 1 tablet by mouth 3 (Three) Times a Day., Disp: 90 tablet, Rfl: 5  •  methadone (DOLOPHINE) 10 MG tablet, Take 1 tablet by mouth 4 (Four) Times a Day,, Disp: 120 tablet, Rfl: 0  •  sildenafil (VIAGRA) 100 MG tablet, Half to 1 tablet 30 minutes before sexual activity, Disp: 30 tablet, Rfl: 5    Allergies: Patient has no known allergies.    Physical Exam  Constitutional:       Appearance: Normal appearance. He is normal weight.   HENT:      Head: Normocephalic.      Right Ear: External ear normal.      Left Ear: External ear normal.      Nose: Nose normal.      Mouth/Throat:      Mouth: Mucous membranes are moist.   Neurological:      Mental Status: He is alert and oriented to person, place, and time. Mental status is at baseline.   Psychiatric:         Mood and Affect: Mood normal.         Behavior: Behavior normal.          Result Review :                   Assessment and Plan    Diagnoses and all orders for this visit:    1. Chronic bilateral low back pain with bilateral sciatica (Primary)  Comments:  Higinio reviewed and medications and deiscussed exercises    2. Primary osteoarthritis of other site  Comments:  Celebrex ice continue some Stephanie gel if he wants to as well monitor              Follow Up   Return in about 4 weeks (around 5/2/2023).  Patient was given instructions and counseling regarding his condition or for health maintenance advice. Please see specific  information pulled into the AVS if appropriate.     Per Manley MD

## 2023-05-02 ENCOUNTER — TELEMEDICINE (OUTPATIENT)
Dept: FAMILY MEDICINE CLINIC | Facility: CLINIC | Age: 63
End: 2023-05-02
Payer: MEDICARE

## 2023-05-02 VITALS — HEIGHT: 69 IN | BODY MASS INDEX: 26.66 KG/M2 | WEIGHT: 180 LBS

## 2023-05-02 DIAGNOSIS — M54.41 CHRONIC BILATERAL LOW BACK PAIN WITH BILATERAL SCIATICA: ICD-10-CM

## 2023-05-02 DIAGNOSIS — F41.9 ANXIETY: ICD-10-CM

## 2023-05-02 DIAGNOSIS — M19.09 PRIMARY OSTEOARTHRITIS OF OTHER SITE: Primary | ICD-10-CM

## 2023-05-02 DIAGNOSIS — M54.42 CHRONIC BILATERAL LOW BACK PAIN WITH BILATERAL SCIATICA: ICD-10-CM

## 2023-05-02 DIAGNOSIS — G89.29 CHRONIC BILATERAL LOW BACK PAIN WITH BILATERAL SCIATICA: ICD-10-CM

## 2023-05-02 RX ORDER — METHADONE HYDROCHLORIDE 10 MG/1
10 TABLET ORAL 4 TIMES DAILY
Qty: 120 TABLET | Refills: 0 | Status: SHIPPED | OUTPATIENT
Start: 2023-05-02

## 2023-05-02 RX ORDER — CELECOXIB 200 MG/1
200 CAPSULE ORAL DAILY
Qty: 30 CAPSULE | Refills: 2 | Status: SHIPPED | OUTPATIENT
Start: 2023-05-02

## 2023-05-02 NOTE — PROGRESS NOTES
"Chief Complaint  Back Pain and Restless Legs Syndrome    Subjective          LONNIE RANGEL presents to [facility]  History of Present Illness  She comes in today saying that he is doing better he recently contracted COVID says he been really sick he says he got his medications he went acute care center he said he is gotten better he says he needs refill of his regular medications now and he says its still been a little bit tiring but he said he is much improved.  Patient does consent to have his visit done through [email or web address] today he is at home and I am in the clinic  Back Pain        Objective   Vital Signs:   Ht 175.3 cm (69.02\")   Wt 81.6 kg (180 lb)   BMI 26.57 kg/m²     Body mass index is 26.57 kg/m².    Review of Systems   Constitutional: Negative.    HENT: Negative for congestion, dental problem, ear discharge, ear pain and sore throat.    Respiratory: Negative for apnea, chest tightness and shortness of breath.    Gastrointestinal: Negative for constipation and nausea.   Endocrine: Negative for polyuria.   Genitourinary: Negative for difficulty urinating.   Musculoskeletal: Positive for arthralgias and back pain. Negative for gait problem.   Skin: Negative for rash.   Hematological: Negative for adenopathy.       Past History:  Medical History: has a past medical history of Acute frontal sinusitis, Anxiety, At high risk for falls, Chronic low back pain, Chronic pain, Chronic pain syndrome, Degeneration of lumbar intervertebral disc, GERD without esophagitis, H/O anxiety state, Herpes labialis, High risk medication use, History of erectile dysfunction, Insomnia, Low back pain, Lumbago with sciatica, left side, Obstructive sleep apnea syndrome, Protrusion of lumbar intervertebral disc, Radiculopathy, Reactive depression (situational), Tobacco use, and Weight loss.   Surgical History: has a past surgical history that includes No past surgeries.         Current Outpatient " Medications:   •  acyclovir (Zovirax) 400 MG tablet, Take 1 tablet by mouth 2 (Two) Times a Day. Take no more than 5 doses a day., Disp: 30 tablet, Rfl: 5  •  celecoxib (CeleBREX) 200 MG capsule, Take 1 capsule by mouth Daily., Disp: 30 capsule, Rfl: 2  •  diazePAM (VALIUM) 10 MG tablet, Take 1 tablet by mouth Every 6 (Six) Hours., Disp: 120 tablet, Rfl: 0  •  esomeprazole (nexIUM) 40 MG capsule, Take 1 capsule by mouth Every Morning Before Breakfast., Disp: 90 capsule, Rfl: 1  •  gabapentin (NEURONTIN) 600 MG tablet, Take 1 tablet by mouth 3 (Three) Times a Day., Disp: 90 tablet, Rfl: 5  •  methadone (DOLOPHINE) 10 MG tablet, Take 1 tablet by mouth 4 (Four) Times a Day,, Disp: 120 tablet, Rfl: 0  •  sildenafil (VIAGRA) 100 MG tablet, Half to 1 tablet 30 minutes before sexual activity, Disp: 30 tablet, Rfl: 5    Allergies: Patient has no known allergies.    Physical Exam  Vitals reviewed.   Constitutional:       Appearance: Normal appearance.   HENT:      Head: Normocephalic.      Right Ear: External ear normal.      Left Ear: External ear normal.      Nose: Nose normal.      Mouth/Throat:      Mouth: Mucous membranes are moist.   Eyes:      Pupils: Pupils are equal, round, and reactive to light.   Neurological:      Mental Status: He is alert and oriented to person, place, and time. Mental status is at baseline.   Psychiatric:         Mood and Affect: Mood normal.         Behavior: Behavior normal.          Result Review :                   Assessment and Plan    Diagnoses and all orders for this visit:    1. Primary osteoarthritis of other site (Primary)  Comments:  refill celebrex     2. Chronic bilateral low back pain with bilateral sciatica  Comments:  Higinio reviewed and medications and deiscussed exercises  Orders:  -     methadone (DOLOPHINE) 10 MG tablet; Take 1 tablet by mouth 4 (Four) Times a Day,  Dispense: 120 tablet; Refill: 0    3. Anxiety  Comments:  stable    Other orders  -     celecoxib (CeleBREX)  200 MG capsule; Take 1 capsule by mouth Daily.  Dispense: 30 capsule; Refill: 2              Follow Up   No follow-ups on file.  Patient was given instructions and counseling regarding his condition or for health maintenance advice. Please see specific information pulled into the AVS if appropriate.     Per Manley MD

## 2023-05-03 DIAGNOSIS — M54.41 CHRONIC BILATERAL LOW BACK PAIN WITH BILATERAL SCIATICA: ICD-10-CM

## 2023-05-03 DIAGNOSIS — M54.42 CHRONIC BILATERAL LOW BACK PAIN WITH BILATERAL SCIATICA: ICD-10-CM

## 2023-05-03 DIAGNOSIS — G89.29 CHRONIC BILATERAL LOW BACK PAIN WITH BILATERAL SCIATICA: ICD-10-CM

## 2023-05-03 RX ORDER — DIAZEPAM 10 MG/1
10 TABLET ORAL EVERY 6 HOURS
Qty: 120 TABLET | Refills: 0 | Status: SHIPPED | OUTPATIENT
Start: 2023-05-03

## 2023-05-03 NOTE — TELEPHONE ENCOUNTER
Caller: LONNIE RANGEL    Relationship: Self    Best call back number: [phone]    Requested Prescriptions:   Requested Prescriptions     Pending Prescriptions Disp Refills   • diazePAM (VALIUM) 10 MG tablet 120 tablet 0     Sig: Take 1 tablet by mouth Every 6 (Six) Hours.        Pharmacy where request should be sent: [facility] [id number] [place] [id number] [facility] [address] - [phone]  - [phone] FX     Last office visit with prescribing clinician: 8/2/2022   Last telemedicine visit with prescribing clinician: 6/1/2023   Next office visit with prescribing clinician: 6/1/2023       Does the patient have less than a 3 day supply:  [x] Yes  [] No    Would you like a call back once the refill request has been completed: [x] Yes [] No    If the office needs to give you a call back, can they leave a voicemail: [x] Yes [] No    Luciana Feliz   05/03/23 09:43 EDT

## 2023-06-01 ENCOUNTER — TELEMEDICINE (OUTPATIENT)
Dept: FAMILY MEDICINE CLINIC | Facility: CLINIC | Age: 63
End: 2023-06-01

## 2023-06-01 VITALS — WEIGHT: 175.3 LBS | BODY MASS INDEX: 25.96 KG/M2 | HEIGHT: 69 IN

## 2023-06-01 DIAGNOSIS — G25.81 RESTLESS LEG SYNDROME: ICD-10-CM

## 2023-06-01 DIAGNOSIS — M54.42 CHRONIC BILATERAL LOW BACK PAIN WITH BILATERAL SCIATICA: Primary | ICD-10-CM

## 2023-06-01 DIAGNOSIS — M54.41 CHRONIC BILATERAL LOW BACK PAIN WITH BILATERAL SCIATICA: Primary | ICD-10-CM

## 2023-06-01 DIAGNOSIS — G89.29 CHRONIC BILATERAL LOW BACK PAIN WITH BILATERAL SCIATICA: Primary | ICD-10-CM

## 2023-06-01 RX ORDER — METHYLPREDNISOLONE 4 MG/1
TABLET ORAL
Qty: 19 TABLET | Refills: 0 | Status: SHIPPED | OUTPATIENT
Start: 2023-06-01 | End: 2023-06-11

## 2023-06-01 RX ORDER — DIAZEPAM 10 MG/1
10 TABLET ORAL EVERY 6 HOURS
Qty: 120 TABLET | Refills: 0 | Status: SHIPPED | OUTPATIENT
Start: 2023-06-01

## 2023-06-01 RX ORDER — METHADONE HYDROCHLORIDE 10 MG/1
10 TABLET ORAL 4 TIMES DAILY
Qty: 120 TABLET | Refills: 0 | Status: SHIPPED | OUTPATIENT
Start: 2023-06-01

## 2023-06-01 NOTE — PROGRESS NOTES
"Chief Complaint  Back Pain    Subjective          LONNIE RANGEL presents to [facility]  History of Present Illness  Patient comes in today for recheck on his medication says he is doing okay he says his backs been flared up a little bit would like to try some medications to see if we can help he says otherwise he has been doing about his normal self he says he just had a lot of stiffness in the morning had a lot of pain here recently as well.  Patient does consent to have his visit done today through [employer] he is at home and I am in the clinic      Objective   Vital Signs:   Ht 175.3 cm (69.02\")   Wt 79.5 kg (175 lb 4.8 oz)   BMI 25.88 kg/m²     Body mass index is 25.88 kg/m².    Review of Systems   Constitutional: Negative.    HENT: Negative for congestion, dental problem, ear discharge, ear pain and sore throat.    Respiratory: Negative for apnea, chest tightness and shortness of breath.    Gastrointestinal: Negative for constipation and nausea.   Endocrine: Negative for polyuria.   Genitourinary: Negative for difficulty urinating.   Musculoskeletal: Positive for back pain and gait problem. Negative for arthralgias.   Skin: Negative for rash.   Hematological: Negative for adenopathy.       Past History:  Medical History: has a past medical history of Acute frontal sinusitis, Anxiety, At high risk for falls, Chronic low back pain, Chronic pain, Chronic pain syndrome, Degeneration of lumbar intervertebral disc, GERD without esophagitis, H/O anxiety state, Herpes labialis, High risk medication use, History of erectile dysfunction, Insomnia, Low back pain, Lumbago with sciatica, left side, Obstructive sleep apnea syndrome, Protrusion of lumbar intervertebral disc, Radiculopathy, Reactive depression (situational), Tobacco use, and Weight loss.   Surgical History: has a past surgical history that includes No past surgeries.         Current Outpatient Medications:   •  acyclovir " (Zovirax) 400 MG tablet, Take 1 tablet by mouth 2 (Two) Times a Day. Take no more than 5 doses a day., Disp: 30 tablet, Rfl: 5  •  celecoxib (CeleBREX) 200 MG capsule, Take 1 capsule by mouth Daily., Disp: 30 capsule, Rfl: 2  •  diazePAM (VALIUM) 10 MG tablet, Take 1 tablet by mouth Every 6 (Six) Hours., Disp: 120 tablet, Rfl: 0  •  esomeprazole (nexIUM) 40 MG capsule, Take 1 capsule by mouth Every Morning Before Breakfast., Disp: 90 capsule, Rfl: 1  •  gabapentin (NEURONTIN) 600 MG tablet, Take 1 tablet by mouth 3 (Three) Times a Day., Disp: 90 tablet, Rfl: 5  •  methadone (DOLOPHINE) 10 MG tablet, Take 1 tablet by mouth 4 (Four) Times a Day,, Disp: 120 tablet, Rfl: 0  •  sildenafil (VIAGRA) 100 MG tablet, Half to 1 tablet 30 minutes before sexual activity, Disp: 30 tablet, Rfl: 5  •  methylPREDNISolone (MEDROL) 4 MG tablet, Take 3 tablets by mouth Daily for 3 days, THEN 2 tablets Daily for 3 days, THEN 1 tablet Daily for 4 days., Disp: 19 tablet, Rfl: 0    Allergies: Patient has no known allergies.    Physical Exam  Constitutional:       Appearance: Normal appearance.   HENT:      Head: Normocephalic.      Right Ear: External ear normal.      Left Ear: External ear normal.      Nose: Nose normal.      Mouth/Throat:      Mouth: Mucous membranes are moist.   Eyes:      Pupils: Pupils are equal, round, and reactive to light.   Neurological:      General: No focal deficit present.      Mental Status: He is alert.   Psychiatric:         Mood and Affect: Mood normal.         Behavior: Behavior normal.          Result Review :                   Assessment and Plan    Diagnoses and all orders for this visit:    1. Chronic bilateral low back pain with bilateral sciatica (Primary)  Comments:  Higinio reviewed and medications and deiscussed exercises and will try a little bit of Medrol taper for his acute flareup  Orders:  -     diazePAM (VALIUM) 10 MG tablet; Take 1 tablet by mouth Every 6 (Six) Hours.  Dispense: 120 tablet;  Refill: 0  -     methadone (DOLOPHINE) 10 MG tablet; Take 1 tablet by mouth 4 (Four) Times a Day,  Dispense: 120 tablet; Refill: 0  -     methylPREDNISolone (MEDROL) 4 MG tablet; Take 3 tablets by mouth Daily for 3 days, THEN 2 tablets Daily for 3 days, THEN 1 tablet Daily for 4 days.  Dispense: 19 tablet; Refill: 0    2. Restless leg syndrome  Comments:  Stable continue meds              Follow Up   No follow-ups on file.  Patient was given instructions and counseling regarding his condition or for health maintenance advice. Please see specific information pulled into the AVS if appropriate.     Per Manley MD

## 2023-08-09 ENCOUNTER — TELEMEDICINE (OUTPATIENT)
Dept: FAMILY MEDICINE CLINIC | Facility: CLINIC | Age: 63
End: 2023-08-09
Payer: MEDICARE

## 2023-08-09 DIAGNOSIS — I73.9 CLAUDICATION: ICD-10-CM

## 2023-08-09 DIAGNOSIS — M54.41 CHRONIC BILATERAL LOW BACK PAIN WITH BILATERAL SCIATICA: Primary | ICD-10-CM

## 2023-08-09 DIAGNOSIS — K21.9 GASTROESOPHAGEAL REFLUX DISEASE WITHOUT ESOPHAGITIS: ICD-10-CM

## 2023-08-09 DIAGNOSIS — M54.42 CHRONIC BILATERAL LOW BACK PAIN WITH BILATERAL SCIATICA: Primary | ICD-10-CM

## 2023-08-09 DIAGNOSIS — G89.29 CHRONIC BILATERAL LOW BACK PAIN WITH BILATERAL SCIATICA: Primary | ICD-10-CM

## 2023-08-09 RX ORDER — METHADONE HYDROCHLORIDE 10 MG/1
10 TABLET ORAL 4 TIMES DAILY
Qty: 120 TABLET | Refills: 0 | Status: SHIPPED | OUTPATIENT
Start: 2023-08-09

## 2023-08-09 RX ORDER — ESOMEPRAZOLE MAGNESIUM 40 MG/1
40 CAPSULE, DELAYED RELEASE ORAL
Qty: 90 CAPSULE | Refills: 1 | Status: SHIPPED | OUTPATIENT
Start: 2023-08-09

## 2023-08-09 RX ORDER — DIAZEPAM 10 MG/1
10 TABLET ORAL EVERY 6 HOURS
Qty: 120 TABLET | Refills: 0 | Status: SHIPPED | OUTPATIENT
Start: 2023-08-09

## 2023-08-09 NOTE — PROGRESS NOTES
Chief Complaint  Back Pain    Subjective          LONNIE RANGEL presents to [facility]  History of Present Illness  She comes in today saying he is doing developed about the last month or so pain in his lower His Upper and above His Knees He Says It Usually after He Walks for about 3 to 5 Minutes It Starts Hurting He Said He Rests for about 30 Seconds to a Minute He Gets Better He Starts up Again Then Basically within a Few Minutes to Start Returning He Says He Just Notices Getting Worse in the Last Few Weeks He Says He Could Not Tell If It Was Back or Something Else at This Point in Time He Also Consents to Have His Visit Done through [employer] Today He Is at Home and I Am in the Clinic  Back Pain      Objective   Vital Signs:   There were no vitals taken for this visit.    There is no height or weight on file to calculate BMI.    Review of Systems   Constitutional: Negative.    HENT:  Negative for congestion, dental problem, ear discharge, ear pain and sore throat.    Respiratory:  Negative for apnea, chest tightness and shortness of breath.    Gastrointestinal:  Negative for constipation and nausea.   Endocrine: Negative for polyuria.   Genitourinary:  Negative for difficulty urinating.   Musculoskeletal:  Positive for arthralgias and back pain. Negative for gait problem.   Skin:  Negative for rash.   Hematological:  Negative for adenopathy.   Psychiatric/Behavioral:  The patient is nervous/anxious.      Past History:  Medical History: has a past medical history of Acute frontal sinusitis, Anxiety, At high risk for falls, Chronic low back pain, Chronic pain, Chronic pain syndrome, Degeneration of lumbar intervertebral disc, GERD without esophagitis, H/O anxiety state, Herpes labialis, High risk medication use, History of erectile dysfunction, Insomnia, Low back pain, Lumbago with sciatica, left side, Obstructive sleep apnea syndrome, Protrusion of lumbar intervertebral disc,  Radiculopathy, Reactive depression (situational), Tobacco use, and Weight loss.   Surgical History: has a past surgical history that includes No past surgeries.         Current Outpatient Medications:     diazePAM (VALIUM) 10 MG tablet, Take 1 tablet by mouth Every 6 (Six) Hours., Disp: 120 tablet, Rfl: 0    esomeprazole (nexIUM) 40 MG capsule, Take 1 capsule by mouth Every Morning Before Breakfast., Disp: 90 capsule, Rfl: 1    methadone (DOLOPHINE) 10 MG tablet, Take 1 tablet by mouth 4 (Four) Times a Day,, Disp: 120 tablet, Rfl: 0    acyclovir (Zovirax) 400 MG tablet, Take 1 tablet by mouth 2 (Two) Times a Day. Take no more than 5 doses a day., Disp: 30 tablet, Rfl: 5    gabapentin (NEURONTIN) 600 MG tablet, Take 1 tablet by mouth 3 (Three) Times a Day., Disp: 90 tablet, Rfl: 5    meloxicam (Mobic) 15 MG tablet, Take 1 tablet by mouth Daily., Disp: 30 tablet, Rfl: 5    sildenafil (VIAGRA) 100 MG tablet, Half to 1 tablet 30 minutes before sexual activity, Disp: 30 tablet, Rfl: 5    Allergies: Patient has no known allergies.    Physical Exam  Constitutional:       Appearance: Normal appearance.   HENT:      Right Ear: External ear normal.      Left Ear: External ear normal.      Nose: Nose normal.      Mouth/Throat:      Mouth: Mucous membranes are moist.   Eyes:      Pupils: Pupils are equal, round, and reactive to light.   Neurological:      Mental Status: He is alert and oriented to person, place, and time. Mental status is at baseline.   Psychiatric:         Mood and Affect: Mood normal.        Result Review :                   Assessment and Plan    Diagnoses and all orders for this visit:    1. Chronic bilateral low back pain with bilateral sciatica (Primary)  Comments:  Higinio reviewed and medications and discussed exercises: Try some meloxicam and discussed risk benefit  Orders:  -     diazePAM (VALIUM) 10 MG tablet; Take 1 tablet by mouth Every 6 (Six) Hours.  Dispense: 120 tablet; Refill: 0  -      methadone (DOLOPHINE) 10 MG tablet; Take 1 tablet by mouth 4 (Four) Times a Day,  Dispense: 120 tablet; Refill: 0    2. Claudication  Comments:  start baby asa and discussed DEVEN`s which she wants to wait    3. Gastroesophageal reflux disease without esophagitis  Comments:  Will try nexium and montior  Orders:  -     esomeprazole (nexIUM) 40 MG capsule; Take 1 capsule by mouth Every Morning Before Breakfast.  Dispense: 90 capsule; Refill: 1              Follow Up   No follow-ups on file.  Patient was given instructions and counseling regarding his condition or for health maintenance advice. Please see specific information pulled into the AVS if appropriate.     Per Manley MD

## 2023-09-08 ENCOUNTER — TELEMEDICINE (OUTPATIENT)
Dept: FAMILY MEDICINE CLINIC | Facility: CLINIC | Age: 63
End: 2023-09-08
Payer: MEDICARE

## 2023-09-08 VITALS — WEIGHT: 175 LBS | BODY MASS INDEX: 25.92 KG/M2 | HEIGHT: 69 IN

## 2023-09-08 DIAGNOSIS — M54.42 CHRONIC BILATERAL LOW BACK PAIN WITH BILATERAL SCIATICA: ICD-10-CM

## 2023-09-08 DIAGNOSIS — G89.29 CHRONIC BILATERAL LOW BACK PAIN WITH BILATERAL SCIATICA: ICD-10-CM

## 2023-09-08 DIAGNOSIS — G25.81 RESTLESS LEG SYNDROME: ICD-10-CM

## 2023-09-08 DIAGNOSIS — M54.41 CHRONIC BILATERAL LOW BACK PAIN WITH BILATERAL SCIATICA: ICD-10-CM

## 2023-09-08 RX ORDER — METHADONE HYDROCHLORIDE 10 MG/1
10 TABLET ORAL 4 TIMES DAILY
Qty: 120 TABLET | Refills: 0 | Status: SHIPPED | OUTPATIENT
Start: 2023-09-08

## 2023-09-08 RX ORDER — GABAPENTIN 600 MG/1
600 TABLET ORAL 3 TIMES DAILY
Qty: 180 TABLET | Refills: 1 | Status: SHIPPED | OUTPATIENT
Start: 2023-09-08

## 2023-09-08 RX ORDER — GABAPENTIN 600 MG/1
600 TABLET ORAL 3 TIMES DAILY
Qty: 90 TABLET | Refills: 5 | Status: SHIPPED | OUTPATIENT
Start: 2023-09-08 | End: 2023-09-08 | Stop reason: SDUPTHER

## 2023-09-08 RX ORDER — DIAZEPAM 10 MG/1
10 TABLET ORAL EVERY 6 HOURS
Qty: 120 TABLET | Refills: 0 | Status: SHIPPED | OUTPATIENT
Start: 2023-09-08

## 2023-09-08 NOTE — PROGRESS NOTES
"Chief Complaint  Restless Legs Syndrome, Back Pain, and Anxiety    Subjective          LONNIE RANGEL presents to [facility]  History of Present Illness  He comes in today to get his medications refilled for his back pain and spasms he says he is doing relatively well he continues on medication and had his dose changed he is quite somewhat concerned about his insurance changes.  He does consent to have his visit done through [email or web address] today he is at home and I am in the clinic  Back Pain    Anxiety  Patient reports no nausea or shortness of breath.         Objective   Vital Signs:   Ht 175.3 cm (69.02\")   Wt 79.4 kg (175 lb)   BMI 25.83 kg/m²     Body mass index is 25.83 kg/m².    Review of Systems   Constitutional: Negative.    HENT:  Negative for congestion, dental problem, ear discharge, ear pain and sore throat.    Respiratory:  Negative for apnea, chest tightness and shortness of breath.    Gastrointestinal:  Negative for constipation and nausea.   Endocrine: Negative for polyuria.   Genitourinary:  Negative for difficulty urinating.   Musculoskeletal:  Positive for arthralgias, back pain and neck pain. Negative for gait problem.   Skin:  Negative for rash.   Hematological:  Negative for adenopathy.     Past History:  Medical History: has a past medical history of Acute frontal sinusitis, Anxiety, At high risk for falls, Chronic low back pain, Chronic pain, Chronic pain syndrome, Degeneration of lumbar intervertebral disc, GERD without esophagitis, H/O anxiety state, Herpes labialis, High risk medication use, History of erectile dysfunction, Insomnia, Low back pain, Lumbago with sciatica, left side, Obstructive sleep apnea syndrome, Protrusion of lumbar intervertebral disc, Radiculopathy, Reactive depression (situational), Tobacco use, and Weight loss.   Surgical History: has a past surgical history that includes No past surgeries.         Current Outpatient Medications:     " acyclovir (Zovirax) 400 MG tablet, Take 1 tablet by mouth 2 (Two) Times a Day. Take no more than 5 doses a day., Disp: 30 tablet, Rfl: 5    diazePAM (VALIUM) 10 MG tablet, Take 1 tablet by mouth Every 6 (Six) Hours., Disp: 120 tablet, Rfl: 0    esomeprazole (nexIUM) 40 MG capsule, Take 1 capsule by mouth Every Morning Before Breakfast., Disp: 90 capsule, Rfl: 1    gabapentin (NEURONTIN) 600 MG tablet, Take 1 tablet by mouth 3 (Three) Times a Day., Disp: 180 tablet, Rfl: 1    meloxicam (Mobic) 15 MG tablet, Take 1 tablet by mouth Daily., Disp: 30 tablet, Rfl: 5    methadone (DOLOPHINE) 10 MG tablet, Take 1 tablet by mouth 4 (Four) Times a Day,, Disp: 120 tablet, Rfl: 0    sildenafil (VIAGRA) 100 MG tablet, Half to 1 tablet 30 minutes before sexual activity, Disp: 30 tablet, Rfl: 5    Allergies: Patient has no known allergies.    Physical Exam  Constitutional:       Appearance: Normal appearance.   HENT:      Head: Normocephalic.      Right Ear: External ear normal.      Left Ear: External ear normal.      Nose: Nose normal.      Mouth/Throat:      Pharynx: Oropharynx is clear.   Eyes:      Pupils: Pupils are equal, round, and reactive to light.   Neurological:      General: No focal deficit present.      Mental Status: He is alert and oriented to person, place, and time.        Result Review :                   Assessment and Plan    Diagnoses and all orders for this visit:    1. Chronic bilateral low back pain with bilateral sciatica  Comments:  Higinio reviewed and medications and discussed exercises: Try some meloxicam and discussed risk benefit  Orders:  -     diazePAM (VALIUM) 10 MG tablet; Take 1 tablet by mouth Every 6 (Six) Hours.  Dispense: 120 tablet; Refill: 0  -     methadone (DOLOPHINE) 10 MG tablet; Take 1 tablet by mouth 4 (Four) Times a Day,  Dispense: 120 tablet; Refill: 0    2. Restless leg syndrome  Comments:  As per refill Neurontin  Orders:  -     Discontinue: gabapentin (NEURONTIN) 600 MG tablet;  Take 1 tablet by mouth 3 (Three) Times a Day.  Dispense: 90 tablet; Refill: 5  -     gabapentin (NEURONTIN) 600 MG tablet; Take 1 tablet by mouth 3 (Three) Times a Day.  Dispense: 180 tablet; Refill: 1              Follow Up   No follow-ups on file.  Patient was given instructions and counseling regarding his condition or for health maintenance advice. Please see specific information pulled into the AVS if appropriate.     Per Manley MD

## 2023-10-06 ENCOUNTER — TELEMEDICINE (OUTPATIENT)
Dept: FAMILY MEDICINE CLINIC | Facility: CLINIC | Age: 63
End: 2023-10-06
Payer: MEDICARE

## 2023-10-06 DIAGNOSIS — M54.42 CHRONIC BILATERAL LOW BACK PAIN WITH BILATERAL SCIATICA: Primary | ICD-10-CM

## 2023-10-06 DIAGNOSIS — M54.41 CHRONIC BILATERAL LOW BACK PAIN WITH BILATERAL SCIATICA: Primary | ICD-10-CM

## 2023-10-06 DIAGNOSIS — G25.81 RESTLESS LEG SYNDROME: ICD-10-CM

## 2023-10-06 DIAGNOSIS — G89.29 CHRONIC BILATERAL LOW BACK PAIN WITH BILATERAL SCIATICA: Primary | ICD-10-CM

## 2023-10-06 RX ORDER — METHADONE HYDROCHLORIDE 10 MG/1
10 TABLET ORAL 4 TIMES DAILY
Qty: 120 TABLET | Refills: 0 | Status: SHIPPED | OUTPATIENT
Start: 2023-10-06

## 2023-10-06 RX ORDER — SILDENAFIL 100 MG/1
TABLET, FILM COATED ORAL
Qty: 30 TABLET | Refills: 5 | Status: SHIPPED | OUTPATIENT
Start: 2023-10-06

## 2023-10-06 RX ORDER — GABAPENTIN 600 MG/1
600 TABLET ORAL 3 TIMES DAILY
Qty: 270 TABLET | Refills: 1 | Status: SHIPPED | OUTPATIENT
Start: 2023-10-06

## 2023-10-06 RX ORDER — DIAZEPAM 10 MG/1
10 TABLET ORAL EVERY 6 HOURS
Qty: 120 TABLET | Refills: 0 | Status: SHIPPED | OUTPATIENT
Start: 2023-10-06

## 2023-10-06 NOTE — PROGRESS NOTES
Chief Complaint  Back Pain and Anxiety    Subjective          LONNIE RANGEL presents to [facility]  History of Present Illness  Patient comes in today saying he needs to get several of his medications refilled says he is doing relatively well still having consistency with his neuropathy his restless legs and his pain he says his medications have made a big difference he also needs a sildenafil refilled as well he says otherwise has had no other shortness of breath or difficulties at this time.  Patient consents to have his visit done through [email or web address] today he is at home and I am in the clinic  Back Pain  Associated symptoms include numbness.   Anxiety          Objective   Vital Signs:   There were no vitals taken for this visit.    There is no height or weight on file to calculate BMI.    Review of Systems   Musculoskeletal:  Positive for back pain.   Neurological:  Positive for numbness.     Past History:  Medical History: has a past medical history of Acute frontal sinusitis, Anxiety, At high risk for falls, Chronic low back pain, Chronic pain, Chronic pain syndrome, Degeneration of lumbar intervertebral disc, GERD without esophagitis, H/O anxiety state, Herpes labialis, High risk medication use, History of erectile dysfunction, Insomnia, Low back pain, Lumbago with sciatica, left side, Obstructive sleep apnea syndrome, Protrusion of lumbar intervertebral disc, Radiculopathy, Reactive depression (situational), Tobacco use, and Weight loss.   Surgical History: has a past surgical history that includes No past surgeries.         Current Outpatient Medications:     diazePAM (VALIUM) 10 MG tablet, Take 1 tablet by mouth Every 6 (Six) Hours., Disp: 120 tablet, Rfl: 0    gabapentin (NEURONTIN) 600 MG tablet, Take 1 tablet by mouth 3 (Three) Times a Day., Disp: 270 tablet, Rfl: 1    methadone (DOLOPHINE) 10 MG tablet, Take 1 tablet by mouth 4 (Four) Times a Day,, Disp: 120 tablet, Rfl: 0     sildenafil (VIAGRA) 100 MG tablet, Half to 1 tablet 30 minutes before sexual activity, Disp: 30 tablet, Rfl: 5    acyclovir (Zovirax) 400 MG tablet, Take 1 tablet by mouth 2 (Two) Times a Day. Take no more than 5 doses a day., Disp: 30 tablet, Rfl: 5    esomeprazole (nexIUM) 40 MG capsule, Take 1 capsule by mouth Every Morning Before Breakfast., Disp: 90 capsule, Rfl: 1    meloxicam (Mobic) 15 MG tablet, Take 1 tablet by mouth Daily., Disp: 30 tablet, Rfl: 5    Allergies: Patient has no known allergies.    Physical Exam  Constitutional:       Appearance: Normal appearance.   HENT:      Head: Normocephalic.      Right Ear: External ear normal.      Left Ear: External ear normal.      Nose: Nose normal.      Mouth/Throat:      Mouth: Mucous membranes are moist.   Eyes:      Pupils: Pupils are equal, round, and reactive to light.   Neurological:      Mental Status: He is alert and oriented to person, place, and time.   Psychiatric:         Mood and Affect: Mood normal.        Result Review :                   Assessment and Plan    Diagnoses and all orders for this visit:    1. Chronic bilateral low back pain with bilateral sciatica (Primary)  Comments:  Higinio reviewed and medications  Orders:  -     diazePAM (VALIUM) 10 MG tablet; Take 1 tablet by mouth Every 6 (Six) Hours.  Dispense: 120 tablet; Refill: 0  -     methadone (DOLOPHINE) 10 MG tablet; Take 1 tablet by mouth 4 (Four) Times a Day,  Dispense: 120 tablet; Refill: 0    2. Restless leg syndrome  Comments:  Higinio refill Neurontin  Orders:  -     gabapentin (NEURONTIN) 600 MG tablet; Take 1 tablet by mouth 3 (Three) Times a Day.  Dispense: 270 tablet; Refill: 1    Other orders  -     sildenafil (VIAGRA) 100 MG tablet; Half to 1 tablet 30 minutes before sexual activity  Dispense: 30 tablet; Refill: 5              Follow Up   No follow-ups on file.  Patient was given instructions and counseling regarding his condition or for health maintenance advice. Please  see specific information pulled into the AVS if appropriate.     Per Manley MD

## 2023-11-02 ENCOUNTER — TELEPHONE (OUTPATIENT)
Dept: FAMILY MEDICINE CLINIC | Facility: CLINIC | Age: 63
End: 2023-11-02
Payer: MEDICARE

## 2023-11-02 NOTE — TELEPHONE ENCOUNTER
HUB CAN RELAY & RESCHEDULE  Called patient to reschedule 11/06 appt, provider is out of the office.   Will have to be in office visit.

## 2023-11-03 ENCOUNTER — OFFICE VISIT (OUTPATIENT)
Dept: FAMILY MEDICINE CLINIC | Facility: CLINIC | Age: 63
End: 2023-11-03
Payer: MEDICARE

## 2023-11-03 VITALS
OXYGEN SATURATION: 95 % | DIASTOLIC BLOOD PRESSURE: 70 MMHG | SYSTOLIC BLOOD PRESSURE: 146 MMHG | HEART RATE: 86 BPM | RESPIRATION RATE: 18 BRPM | BODY MASS INDEX: 29.33 KG/M2 | HEIGHT: 69 IN | WEIGHT: 198 LBS

## 2023-11-03 DIAGNOSIS — G25.81 RESTLESS LEG SYNDROME: ICD-10-CM

## 2023-11-03 DIAGNOSIS — G89.29 CHRONIC LOW BACK PAIN WITHOUT SCIATICA, UNSPECIFIED BACK PAIN LATERALITY: Primary | ICD-10-CM

## 2023-11-03 DIAGNOSIS — M54.50 CHRONIC LOW BACK PAIN WITHOUT SCIATICA, UNSPECIFIED BACK PAIN LATERALITY: Primary | ICD-10-CM

## 2023-11-03 DIAGNOSIS — Z79.899 MEDICATION MANAGEMENT: ICD-10-CM

## 2023-11-03 LAB

## 2023-11-03 PROCEDURE — 1160F RVW MEDS BY RX/DR IN RCRD: CPT | Performed by: NURSE PRACTITIONER

## 2023-11-03 PROCEDURE — 99213 OFFICE O/P EST LOW 20 MIN: CPT | Performed by: NURSE PRACTITIONER

## 2023-11-03 PROCEDURE — 1159F MED LIST DOCD IN RCRD: CPT | Performed by: NURSE PRACTITIONER

## 2023-11-03 PROCEDURE — 80305 DRUG TEST PRSMV DIR OPT OBS: CPT | Performed by: NURSE PRACTITIONER

## 2023-11-03 NOTE — Clinical Note
As we discussed in the clinic, this patient is requesting a refill of Methadone and Valium. A urine drug screen and contract was completed during this clinic visit.
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
## 2023-11-04 DIAGNOSIS — M54.42 CHRONIC BILATERAL LOW BACK PAIN WITH BILATERAL SCIATICA: ICD-10-CM

## 2023-11-04 DIAGNOSIS — G89.29 CHRONIC BILATERAL LOW BACK PAIN WITH BILATERAL SCIATICA: ICD-10-CM

## 2023-11-04 DIAGNOSIS — M54.41 CHRONIC BILATERAL LOW BACK PAIN WITH BILATERAL SCIATICA: ICD-10-CM

## 2023-11-04 RX ORDER — DIAZEPAM 10 MG/1
10 TABLET ORAL EVERY 6 HOURS
Qty: 120 TABLET | Refills: 0 | Status: SHIPPED | OUTPATIENT
Start: 2023-11-04

## 2023-11-04 RX ORDER — METHADONE HYDROCHLORIDE 10 MG/1
10 TABLET ORAL 4 TIMES DAILY
Qty: 120 TABLET | Refills: 0 | Status: SHIPPED | OUTPATIENT
Start: 2023-11-04

## 2023-12-04 DIAGNOSIS — K21.9 GASTROESOPHAGEAL REFLUX DISEASE WITHOUT ESOPHAGITIS: ICD-10-CM

## 2023-12-04 DIAGNOSIS — G25.81 RESTLESS LEG SYNDROME: ICD-10-CM

## 2023-12-04 DIAGNOSIS — G89.29 CHRONIC BILATERAL LOW BACK PAIN WITH BILATERAL SCIATICA: ICD-10-CM

## 2023-12-04 DIAGNOSIS — M54.42 CHRONIC BILATERAL LOW BACK PAIN WITH BILATERAL SCIATICA: ICD-10-CM

## 2023-12-04 DIAGNOSIS — M54.41 CHRONIC BILATERAL LOW BACK PAIN WITH BILATERAL SCIATICA: ICD-10-CM

## 2023-12-04 NOTE — TELEPHONE ENCOUNTER
HUB CAN RELAY & SCHEDULE  Per Dr Cleaning, patient does not need 12/04 appt. Needs to be rescheduled for a February appt. If patient calls, please schedule for February office visit with Black.

## 2023-12-04 NOTE — TELEPHONE ENCOUNTER
Name: RANGEL LONNIE D      Relationship: Self      Best Callback Number: [phone]      HUB PROVIDED THE RELAY MESSAGE FROM THE OFFICE      PATIENT: HAS FURTHER QUESTIONS AND WOULD LIKE A CALL BACK AT THE FOLLOWING PHONE [phone]    ADDITIONAL INFORMATION: PATIENT NEEDS TO BE SEEN EVERY MONTH FOR MEDICATIONS, PLEASE CALL BACK TO DISCUSS.

## 2023-12-06 ENCOUNTER — TELEPHONE (OUTPATIENT)
Dept: FAMILY MEDICINE CLINIC | Facility: CLINIC | Age: 63
End: 2023-12-06

## 2023-12-06 DIAGNOSIS — M54.41 CHRONIC BILATERAL LOW BACK PAIN WITH BILATERAL SCIATICA: Primary | ICD-10-CM

## 2023-12-06 DIAGNOSIS — G89.29 CHRONIC BILATERAL LOW BACK PAIN WITH BILATERAL SCIATICA: Primary | ICD-10-CM

## 2023-12-06 DIAGNOSIS — M54.42 CHRONIC BILATERAL LOW BACK PAIN WITH BILATERAL SCIATICA: Primary | ICD-10-CM

## 2023-12-06 RX ORDER — GABAPENTIN 600 MG/1
600 TABLET ORAL 3 TIMES DAILY
Qty: 90 TABLET | Refills: 0 | Status: SHIPPED | OUTPATIENT
Start: 2023-12-06

## 2023-12-06 RX ORDER — ESOMEPRAZOLE MAGNESIUM 40 MG/1
40 CAPSULE, DELAYED RELEASE ORAL
Qty: 90 CAPSULE | Refills: 1 | Status: SHIPPED | OUTPATIENT
Start: 2023-12-06

## 2023-12-06 RX ORDER — METHADONE HYDROCHLORIDE 10 MG/1
10 TABLET ORAL 4 TIMES DAILY
Qty: 120 TABLET | Refills: 0 | Status: SHIPPED | OUTPATIENT
Start: 2023-12-06

## 2023-12-06 RX ORDER — DIAZEPAM 10 MG/1
10 TABLET ORAL EVERY 6 HOURS
Qty: 120 TABLET | Refills: 0 | Status: SHIPPED | OUTPATIENT
Start: 2023-12-06

## 2023-12-06 RX ORDER — MELOXICAM 15 MG/1
15 TABLET ORAL DAILY
Qty: 90 TABLET | Refills: 1 | Status: SHIPPED | OUTPATIENT
Start: 2023-12-06

## 2023-12-06 NOTE — TELEPHONE ENCOUNTER
Patient is wanting an update on his med refills, I had sent them in a refill request as well. This is a duplicate message. Please advise.

## 2023-12-06 NOTE — TELEPHONE ENCOUNTER
Caller: LONNIE RANGEL    Relationship: Self    Best call back number: [phone]    What was the call regarding: FOLLOWING UPON REFILL REQUEST    Is it okay if the provider responds through MyChart: NO

## 2024-08-27 ENCOUNTER — READMISSION MANAGEMENT (OUTPATIENT)
Dept: CALL CENTER | Facility: HOSPITAL | Age: 64
End: 2024-08-27
Payer: MEDICARE

## 2024-08-27 NOTE — OUTREACH NOTE
Prep Survey      Flowsheet Row Responses   [facility] facility patient discharged from? Non-BH   Is LACE score < 7 ? Non-BH Discharge   Eligibility [facility]   Date of Discharge 08/26/24   Discharge diagnosis ST elevation myocardial infarction (STEMI)   Does the patient have one of the following disease processes/diagnoses(primary or secondary)? Acute MI (STEMI,NSTEMI)   Prep survey completed? Yes            Sadia Ruiz Registered Nurse

## 2024-08-28 ENCOUNTER — TRANSITIONAL CARE MANAGEMENT TELEPHONE ENCOUNTER (OUTPATIENT)
Dept: CALL CENTER | Facility: HOSPITAL | Age: 64
End: 2024-08-28
Payer: MEDICARE

## 2024-08-28 NOTE — OUTREACH NOTE
Call Center TCM Note      Flowsheet Row Responses   [facility] patient discharged from? Non-  [[facility]]   Does the patient have one of the following disease processes/diagnoses(primary or secondary)? Other   TCM attempt successful? Yes   Call start time 1247   Call end time 1257   Discharge diagnosis ST elevation myocardial infarction (STEMI)  [(Patient reports heart cath and stents)]   Person spoke with today (if not patient) and relationship Patient   Meds reviewed with patient/caregiver? Yes  [Patient reports new Brilinta, aspirin, atorvastatin.]   Does the patient have all medications ordered at discharge? No   What is keeping the patient from filling the prescriptions? --  [Aria reports his pharmacy should have the brilinta in this afternoon]   Nursing Interventions Nurse provided patient education   Is the patient taking all medications as directed (includes completed medication regime)? No   What is preventing the patient from taking all medications as directed? Other  [see above. Patient reports that if his pharmacy doesnt have the brilinta today, he will have transferred to another pharmacy]   Comments PCP Dr Cleaning. Hospital follow up with PCP in place for 8/30  115pm   Does the patient have an appointment with their PCP within 7-14 days of discharge? Yes   Has home health visited the patient within 72 hours of discharge? N/A   Psychosocial issues? No   Did the patient receive a copy of their discharge instructions? Yes   Nursing interventions Reviewed instructions with patient   What is the patient's perception of their health status since discharge? Improving   Is the patient/caregiver able to teach back signs and symptoms related to disease process for when to call PCP? Yes   Is the patient/caregiver able to teach back signs and symptoms related to disease process for when to call 911? Yes   Is the patient/caregiver able to teach back the hierarchy of who to  call/visit for symptoms/problems? PCP, Specialist, Home health nurse, Urgent Care, ED, 911 Yes   If the patient is a current smoker, are they able to teach back resources for cessation? Smoking cessation medications  [Patient using nicotine patches. Has not smoked since going to hospital]   TCM call completed? Yes   Call end time 1257   Would this patient benefit from a Referral to [facility] Social Work? No   Is the patient interested in additional calls from an ambulatory ? No            Marisel Somers RN    8/28/2024, 12:59 EDT

## 2024-08-30 ENCOUNTER — OFFICE VISIT (OUTPATIENT)
Dept: FAMILY MEDICINE CLINIC | Facility: CLINIC | Age: 64
End: 2024-08-30
Payer: MEDICARE

## 2024-08-30 VITALS
SYSTOLIC BLOOD PRESSURE: 108 MMHG | OXYGEN SATURATION: 96 % | HEIGHT: 69 IN | HEART RATE: 74 BPM | WEIGHT: 199.4 LBS | BODY MASS INDEX: 29.53 KG/M2 | DIASTOLIC BLOOD PRESSURE: 66 MMHG

## 2024-08-30 DIAGNOSIS — Z98.61 HISTORY OF PERCUTANEOUS CORONARY INTERVENTION: ICD-10-CM

## 2024-08-30 DIAGNOSIS — G89.29 CHRONIC BILATERAL LOW BACK PAIN WITH BILATERAL SCIATICA: ICD-10-CM

## 2024-08-30 DIAGNOSIS — M54.41 CHRONIC BILATERAL LOW BACK PAIN WITH BILATERAL SCIATICA: ICD-10-CM

## 2024-08-30 DIAGNOSIS — I21.02 ST ELEVATION MYOCARDIAL INFARCTION INVOLVING LEFT ANTERIOR DESCENDING (LAD) CORONARY ARTERY: Primary | ICD-10-CM

## 2024-08-30 DIAGNOSIS — M54.42 CHRONIC BILATERAL LOW BACK PAIN WITH BILATERAL SCIATICA: ICD-10-CM

## 2024-08-30 RX ORDER — TICAGRELOR 90 MG/1
90 TABLET ORAL 2 TIMES DAILY
COMMUNITY
Start: 2024-08-28

## 2024-08-30 RX ORDER — OMEPRAZOLE 40 MG/1
1 CAPSULE, DELAYED RELEASE ORAL DAILY
COMMUNITY
Start: 2024-03-07

## 2024-08-30 RX ORDER — METOPROLOL TARTRATE 25 MG/1
25 TABLET, FILM COATED ORAL 2 TIMES DAILY
COMMUNITY
Start: 2024-08-27

## 2024-08-30 RX ORDER — ROPINIROLE 0.5 MG/1
0.5 TABLET, FILM COATED ORAL NIGHTLY
COMMUNITY
End: 2024-08-30 | Stop reason: SDUPTHER

## 2024-08-30 RX ORDER — ATORVASTATIN CALCIUM 80 MG/1
80 TABLET, FILM COATED ORAL DAILY
COMMUNITY

## 2024-08-30 RX ORDER — ASPIRIN 81 MG/1
81 TABLET ORAL DAILY
COMMUNITY
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
## 2024-09-04 ENCOUNTER — TELEPHONE (OUTPATIENT)
Dept: FAMILY MEDICINE CLINIC | Facility: CLINIC | Age: 64
End: 2024-09-04
Payer: MEDICARE

## 2024-09-04 RX ORDER — ROPINIROLE 0.5 MG/1
0.5 TABLET, FILM COATED ORAL NIGHTLY
Qty: 90 TABLET | Refills: 1 | Status: SHIPPED | OUTPATIENT
Start: 2024-09-04

## 2024-09-04 NOTE — TELEPHONE ENCOUNTER
Caller: LONNIE RANGEL    Relationship: Self    Best call back number:  [phone] (Mobile)     Who are you requesting to speak with (clinical staff, provider,  specific staff member):    CLINICAL     What was the call regarding:  PATIENT WAS SUPPOSE TO HEAR ABOUT A REFERRAL FOR PAIN MANAGEMENT   AND SAID NO ONE HAS CALLED HIM     PLEASE CALL

## 2024-09-12 NOTE — TELEPHONE ENCOUNTER
PT CALLED STATED THAT HE WOULD LIKE TO SEE CARDIOLOGIST THAT RAN A EKG ON HIM, PT NOT SURE OF NAME, BUT IT WAS IN [place].    PLEASE ADVISE.

## 2024-09-12 NOTE — TELEPHONE ENCOUNTER
Called patient and he is asking about the cardiology referral, I advised patient the referral is placed and we are in process of completing this. I advised patient our office will call him with the appointment.

## 2024-09-15 PROBLEM — M54.42 CHRONIC BILATERAL LOW BACK PAIN WITH BILATERAL SCIATICA: Status: ACTIVE | Noted: 2024-09-15

## 2024-09-15 PROBLEM — G89.29 CHRONIC BILATERAL LOW BACK PAIN WITH BILATERAL SCIATICA: Status: ACTIVE | Noted: 2024-09-15

## 2024-09-15 PROBLEM — M54.41 CHRONIC BILATERAL LOW BACK PAIN WITH BILATERAL SCIATICA: Status: ACTIVE | Noted: 2024-09-15

## 2024-09-19 ENCOUNTER — OFFICE VISIT (OUTPATIENT)
Dept: CARDIOLOGY | Facility: CLINIC | Age: 64
End: 2024-09-19
Payer: MEDICARE

## 2024-09-19 VITALS
BODY MASS INDEX: 29.92 KG/M2 | OXYGEN SATURATION: 98 % | SYSTOLIC BLOOD PRESSURE: 126 MMHG | DIASTOLIC BLOOD PRESSURE: 72 MMHG | RESPIRATION RATE: 18 BRPM | HEIGHT: 69 IN | HEART RATE: 83 BPM | WEIGHT: 202 LBS

## 2024-09-19 DIAGNOSIS — I35.0 AORTIC STENOSIS, MODERATE: ICD-10-CM

## 2024-09-19 DIAGNOSIS — I25.10 CORONARY ARTERY DISEASE INVOLVING NATIVE CORONARY ARTERY OF NATIVE HEART WITHOUT ANGINA PECTORIS: Primary | ICD-10-CM

## 2024-09-19 PROCEDURE — 1159F MED LIST DOCD IN RCRD: CPT | Performed by: INTERNAL MEDICINE

## 2024-09-19 PROCEDURE — 1160F RVW MEDS BY RX/DR IN RCRD: CPT | Performed by: INTERNAL MEDICINE

## 2024-09-19 PROCEDURE — 99204 OFFICE O/P NEW MOD 45 MIN: CPT | Performed by: INTERNAL MEDICINE

## 2024-09-19 RX ORDER — ATORVASTATIN CALCIUM 80 MG/1
80 TABLET, FILM COATED ORAL DAILY
Qty: 90 TABLET | Refills: 3 | Status: SHIPPED | OUTPATIENT
Start: 2024-09-19

## 2024-09-19 RX ORDER — ASPIRIN 81 MG/1
81 TABLET ORAL DAILY
Qty: 90 TABLET | Refills: 3 | Status: SHIPPED | OUTPATIENT
Start: 2024-09-19

## 2024-09-19 RX ORDER — METOPROLOL TARTRATE 25 MG/1
25 TABLET, FILM COATED ORAL 2 TIMES DAILY
Qty: 180 TABLET | Refills: 3 | Status: SHIPPED | OUTPATIENT
Start: 2024-09-19

## 2024-09-19 RX ORDER — TICAGRELOR 90 MG/1
90 TABLET ORAL 2 TIMES DAILY
Qty: 180 TABLET | Refills: 3 | Status: SHIPPED | OUTPATIENT
Start: 2024-09-19 | End: 2024-09-21 | Stop reason: ALTCHOICE

## 2024-09-20 ENCOUNTER — TELEPHONE (OUTPATIENT)
Dept: CARDIOLOGY | Facility: CLINIC | Age: 64
End: 2024-09-20
Payer: MEDICARE

## 2024-09-20 ENCOUNTER — TELEPHONE (OUTPATIENT)
Dept: FAMILY MEDICINE CLINIC | Facility: CLINIC | Age: 64
End: 2024-09-20
Payer: MEDICARE

## 2024-09-21 RX ORDER — CLOPIDOGREL BISULFATE 75 MG/1
TABLET ORAL
Qty: 93 TABLET | Refills: 0 | Status: SHIPPED | OUTPATIENT
Start: 2024-09-21

## 2024-09-24 DIAGNOSIS — M54.42 CHRONIC BILATERAL LOW BACK PAIN WITH BILATERAL SCIATICA: ICD-10-CM

## 2024-09-24 DIAGNOSIS — G89.29 CHRONIC BILATERAL LOW BACK PAIN WITH BILATERAL SCIATICA: ICD-10-CM

## 2024-09-24 DIAGNOSIS — M54.41 CHRONIC BILATERAL LOW BACK PAIN WITH BILATERAL SCIATICA: ICD-10-CM

## 2024-09-25 RX ORDER — DIAZEPAM 10 MG
10 TABLET ORAL EVERY 6 HOURS
Qty: 120 TABLET | Refills: 0 | OUTPATIENT
Start: 2024-09-25

## 2024-09-26 ENCOUNTER — TELEPHONE (OUTPATIENT)
Dept: FAMILY MEDICINE CLINIC | Facility: CLINIC | Age: 64
End: 2024-09-26
Payer: MEDICARE

## 2024-09-26 DIAGNOSIS — M54.41 CHRONIC BILATERAL LOW BACK PAIN WITH BILATERAL SCIATICA: ICD-10-CM

## 2024-09-26 DIAGNOSIS — G89.29 CHRONIC BILATERAL LOW BACK PAIN WITH BILATERAL SCIATICA: ICD-10-CM

## 2024-09-26 DIAGNOSIS — M54.42 CHRONIC BILATERAL LOW BACK PAIN WITH BILATERAL SCIATICA: ICD-10-CM

## 2024-09-26 NOTE — TELEPHONE ENCOUNTER
Caller: LONNIE RANGEL    Relationship: Self    Best call back number: [phone]    Which medication are you concerned about: LISINOPRIL     Who prescribed you this medication: ANISH    What are your concerns: PT TAKES LISINOPRIL AND IT IS NOT ON HIS MED LIST. HE WANTS TO MAKE SURE HE IS SUPPOSED TO TAKE IT.    How long have you had these concerns: 1 DAY

## 2024-09-29 RX ORDER — METHADONE HYDROCHLORIDE 10 MG/1
10 TABLET ORAL 4 TIMES DAILY
Qty: 120 TABLET | Refills: 0 | OUTPATIENT
Start: 2024-09-29

## 2024-09-30 ENCOUNTER — TELEPHONE (OUTPATIENT)
Dept: FAMILY MEDICINE CLINIC | Facility: CLINIC | Age: 64
End: 2024-09-30
Payer: MEDICARE

## 2024-09-30 NOTE — TELEPHONE ENCOUNTER
Caller: LONNIE RANGEL    Relationship: Self    Best call back number: [phone]     Which medication are you concerned about: methadone (DOLOPHINE) 10 MG tablet     Who prescribed you this medication: DR MANCIA    When did you start taking this medication: 25 YEARS AGO    What are your concerns: PT HAS BEEN TAKING THE MEDICATION FOR 25 YEARS. PT HAS AN APPT ON 11-05-24 WITH THE PAIN CLINIC. PT WILL BE OUT OF HIS MEDICATION IN ABOUT 3 DAYS. PT WANTS TO KNOW WHY DR MANCIA WON'T PRESCRIBE IT FOR HIM. PT DOESN'T UNDERSTAND. PLEASE CALL PT TO DISCUSS.

## 2024-09-30 NOTE — TELEPHONE ENCOUNTER
Spoke with pt, he was going back to [facility] to  and now that he is gone he wants to come back to see  and needs her to refill his methadone. He has been informed multiple times we do not prescribe that here and has been referred to a pain clinic more than once. He said [facility] will not write it for him and he will be out a month but I explained he has had time an opportunities where he would not be out and it is not something we are going to refill for him.

## 2024-10-28 ENCOUNTER — OFFICE VISIT (OUTPATIENT)
Dept: FAMILY MEDICINE CLINIC | Facility: CLINIC | Age: 64
End: 2024-10-28
Payer: MEDICARE

## 2024-10-28 VITALS
DIASTOLIC BLOOD PRESSURE: 76 MMHG | WEIGHT: 195.6 LBS | OXYGEN SATURATION: 97 % | HEART RATE: 84 BPM | SYSTOLIC BLOOD PRESSURE: 116 MMHG | BODY MASS INDEX: 28.97 KG/M2 | HEIGHT: 69 IN

## 2024-10-28 DIAGNOSIS — M51.362 DEGENERATION OF INTERVERTEBRAL DISC OF LUMBAR REGION WITH DISCOGENIC BACK PAIN AND LOWER EXTREMITY PAIN: Primary | ICD-10-CM

## 2024-10-28 PROBLEM — F32.A ANXIETY AND DEPRESSION: Status: ACTIVE | Noted: 2024-10-28

## 2024-10-28 PROBLEM — K21.9 GERD WITHOUT ESOPHAGITIS: Status: ACTIVE | Noted: 2024-10-28

## 2024-10-28 PROBLEM — M19.90 ARTHRITIS: Status: ACTIVE | Noted: 2024-10-28

## 2024-10-28 PROBLEM — G89.29 CHRONIC BILATERAL LOW BACK PAIN WITH BILATERAL SCIATICA: Status: RESOLVED | Noted: 2024-09-15 | Resolved: 2024-10-28

## 2024-10-28 PROBLEM — M51.369 DEGENERATIVE DISC DISEASE, LUMBAR: Status: ACTIVE | Noted: 2024-10-28

## 2024-10-28 PROBLEM — G57.93 NEUROPATHIC PAIN OF BOTH LEGS: Status: ACTIVE | Noted: 2024-10-28

## 2024-10-28 PROBLEM — F41.9 ANXIETY AND DEPRESSION: Status: ACTIVE | Noted: 2024-10-28

## 2024-10-28 PROBLEM — S32.009A LUMBAR VERTEBRAL FRACTURE: Status: ACTIVE | Noted: 2024-10-28

## 2024-10-28 PROBLEM — R01.1 CARDIAC MURMUR: Status: ACTIVE | Noted: 2024-10-28

## 2024-10-28 PROBLEM — M54.41 CHRONIC BILATERAL LOW BACK PAIN WITH BILATERAL SCIATICA: Status: RESOLVED | Noted: 2024-09-15 | Resolved: 2024-10-28

## 2024-10-28 PROBLEM — M54.42 CHRONIC BILATERAL LOW BACK PAIN WITH BILATERAL SCIATICA: Status: RESOLVED | Noted: 2024-09-15 | Resolved: 2024-10-28

## 2024-10-28 PROBLEM — G47.00 INSOMNIA: Status: ACTIVE | Noted: 2024-10-28

## 2024-10-28 PROCEDURE — 96372 THER/PROPH/DIAG INJ SC/IM: CPT | Performed by: PHYSICIAN ASSISTANT

## 2024-10-28 PROCEDURE — 1159F MED LIST DOCD IN RCRD: CPT | Performed by: PHYSICIAN ASSISTANT

## 2024-10-28 PROCEDURE — 1160F RVW MEDS BY RX/DR IN RCRD: CPT | Performed by: PHYSICIAN ASSISTANT

## 2024-10-28 PROCEDURE — 99214 OFFICE O/P EST MOD 30 MIN: CPT | Performed by: PHYSICIAN ASSISTANT

## 2024-10-28 PROCEDURE — 1126F AMNT PAIN NOTED NONE PRSNT: CPT | Performed by: PHYSICIAN ASSISTANT

## 2024-10-28 RX ORDER — TRIAMCINOLONE ACETONIDE 40 MG/ML
80 INJECTION, SUSPENSION INTRA-ARTICULAR; INTRAMUSCULAR ONCE
Status: COMPLETED | OUTPATIENT
Start: 2024-10-28 | End: 2024-10-28

## 2024-10-28 RX ORDER — METHOCARBAMOL 750 MG/1
750 TABLET, FILM COATED ORAL 3 TIMES DAILY
Qty: 90 TABLET | Refills: 1 | Status: SHIPPED | OUTPATIENT
Start: 2024-10-28

## 2024-10-28 RX ORDER — MELOXICAM 15 MG/1
15 TABLET ORAL DAILY
Qty: 90 TABLET | Refills: 1 | Status: SHIPPED | OUTPATIENT
Start: 2024-10-28

## 2024-10-28 RX ORDER — KETOROLAC TROMETHAMINE 30 MG/ML
60 INJECTION, SOLUTION INTRAMUSCULAR; INTRAVENOUS
Status: COMPLETED | OUTPATIENT
Start: 2024-10-28 | End: 2024-10-28

## 2024-10-28 RX ORDER — METHYLPREDNISOLONE 4 MG/1
TABLET ORAL
Qty: 21 TABLET | Refills: 0 | Status: SHIPPED | OUTPATIENT
Start: 2024-10-28

## 2024-10-28 RX ADMIN — KETOROLAC TROMETHAMINE 60 MG: 30 INJECTION, SOLUTION INTRAMUSCULAR; INTRAVENOUS at 10:58

## 2024-10-28 RX ADMIN — TRIAMCINOLONE ACETONIDE 80 MG: 40 INJECTION, SUSPENSION INTRA-ARTICULAR; INTRAMUSCULAR at 10:57

## 2024-10-28 NOTE — ASSESSMENT & PLAN NOTE
Unfortunately any documented previous imaging is unavailable at therefore I told him we would have to start with x-raying his lower back and I suggested conservative management with anti-inflammatories and steroids he was willing to try this but he did not anticipate that this would be helpful.  He would not agree to physical therapy because it is not help him and only causes worsening of his back pain.  I told him based on the information that I have I did not think that an MRI was warranted without a trial of conservative management first.  He agreed to try the conservative management until he can see the pain clinic.
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
## 2024-10-29 LAB
BUN SERPL-MCNC: 17 MG/DL (ref 8–27)
BUN/CREAT SERPL: 17 (ref 10–24)
CALCIUM SERPL-MCNC: 9.4 MG/DL (ref 8.6–10.2)
CHLORIDE SERPL-SCNC: 97 MMOL/L (ref 96–106)
CO2 SERPL-SCNC: 26 MMOL/L (ref 20–29)
CREAT SERPL-MCNC: 0.98 MG/DL (ref 0.76–1.27)
EGFRCR SERPLBLD CKD-EPI 2021: 86 ML/MIN/1.73
GLUCOSE SERPL-MCNC: 110 MG/DL (ref 70–99)
POTASSIUM SERPL-SCNC: 4 MMOL/L (ref 3.5–5.2)
SODIUM SERPL-SCNC: 137 MMOL/L (ref 134–144)

## 2024-11-07 ENCOUNTER — TELEPHONE (OUTPATIENT)
Dept: CARDIOLOGY | Facility: CLINIC | Age: 64
End: 2024-11-07
Payer: MEDICARE

## 2024-11-07 ENCOUNTER — TELEPHONE (OUTPATIENT)
Dept: FAMILY MEDICINE CLINIC | Facility: CLINIC | Age: 64
End: 2024-11-07
Payer: MEDICARE

## 2024-11-07 NOTE — TELEPHONE ENCOUNTER
Caller: Juan Madden    Relationship: Self    Best call back number: [phone]     What is the medical concern/diagnosis: BACK PAIN    What specialty or service is being requested: PAIN MANAGEMENT    What is the provider, practice or medical service name:DR GUTIERREZ    What is the office location: [address] [place]    What is the office phone number: [phone]    Any additional details: PT WOULD LIKE REFERRAL TO PAIN MANAGEMENT.

## 2024-11-07 NOTE — TELEPHONE ENCOUNTER
methylPREDNISolone (MEDROL) 4 MG dose pack [[id number]] (Order [id number])     HAS QUESTIONS ON TAKING THIS MEDICATION     SAID NORI ORIGINALLY PRESCRIBED     PLEASE ADVISE PT

## 2024-12-10 RX ORDER — CLOPIDOGREL BISULFATE 75 MG/1
TABLET ORAL
Qty: 90 TABLET | Refills: 1 | Status: SHIPPED | OUTPATIENT
Start: 2024-12-10

## 2024-12-31 ENCOUNTER — TELEPHONE (OUTPATIENT)
Dept: FAMILY MEDICINE CLINIC | Facility: CLINIC | Age: 64
End: 2024-12-31
Payer: MEDICARE

## 2024-12-31 NOTE — TELEPHONE ENCOUNTER
Spoke with patient and advised we could get him established with a new provider and he advised he was not interested at this time.

## 2025-03-19 ENCOUNTER — TELEPHONE (OUTPATIENT)
Dept: CARDIOLOGY | Facility: CLINIC | Age: 65
End: 2025-03-19

## 2025-03-19 NOTE — TELEPHONE ENCOUNTER
Attempted to reach pt regarding new insurance for this year. We discovered he now has [employer] HMO which we are out of network with, as well as the HMO plans don't have any out of network benefits. Unable to reach pt and left voicemail regarding this information.   IF PT CALLS BACK, HUB TO RELAY